# Patient Record
Sex: FEMALE | Race: WHITE | NOT HISPANIC OR LATINO | Employment: FULL TIME | URBAN - METROPOLITAN AREA
[De-identification: names, ages, dates, MRNs, and addresses within clinical notes are randomized per-mention and may not be internally consistent; named-entity substitution may affect disease eponyms.]

---

## 2018-02-06 ENCOUNTER — OFFICE VISIT (OUTPATIENT)
Dept: INTERNAL MEDICINE CLINIC | Facility: CLINIC | Age: 23
End: 2018-02-06
Payer: COMMERCIAL

## 2018-02-06 VITALS
RESPIRATION RATE: 16 BRPM | BODY MASS INDEX: 21.37 KG/M2 | HEART RATE: 88 BPM | SYSTOLIC BLOOD PRESSURE: 126 MMHG | TEMPERATURE: 98.5 F | DIASTOLIC BLOOD PRESSURE: 86 MMHG | HEIGHT: 59 IN | WEIGHT: 106 LBS | OXYGEN SATURATION: 97 %

## 2018-02-06 DIAGNOSIS — Z13.6 SCREENING FOR HEART DISEASE: ICD-10-CM

## 2018-02-06 DIAGNOSIS — J20.9 ACUTE BRONCHITIS WITH BRONCHOSPASM: ICD-10-CM

## 2018-02-06 DIAGNOSIS — J01.40 ACUTE NON-RECURRENT PANSINUSITIS: Primary | ICD-10-CM

## 2018-02-06 PROBLEM — J45.909 ASTHMA: Status: ACTIVE | Noted: 2018-02-06

## 2018-02-06 PROBLEM — F41.9 ANXIETY AND DEPRESSION: Status: ACTIVE | Noted: 2018-02-06

## 2018-02-06 PROBLEM — F32.A ANXIETY AND DEPRESSION: Status: ACTIVE | Noted: 2018-02-06

## 2018-02-06 PROCEDURE — 99385 PREV VISIT NEW AGE 18-39: CPT | Performed by: PHYSICIAN ASSISTANT

## 2018-02-06 RX ORDER — NORGESTIMATE AND ETHINYL ESTRADIOL
1 KIT DAILY
Refills: 11 | Status: ON HOLD | COMMUNITY
Start: 2018-01-10 | End: 2020-06-22

## 2018-02-06 RX ORDER — AZITHROMYCIN 250 MG/1
TABLET, FILM COATED ORAL
Qty: 6 TABLET | Refills: 0 | Status: SHIPPED | OUTPATIENT
Start: 2018-02-06 | End: 2018-02-11

## 2018-02-06 RX ORDER — BENZONATATE 200 MG/1
CAPSULE ORAL
Refills: 0 | COMMUNITY
Start: 2017-11-15 | End: 2020-06-18

## 2018-02-06 NOTE — PROGRESS NOTES
I have reviewed the notes, assessments, and/or procedures performed by Ashley Huston, I concur with his documentation of Gris Moe

## 2018-02-06 NOTE — PROGRESS NOTES
Assessment/Plan:  Patient will continue use of her inhaler 3-4 times daily as needed for the next 5-6 days  She will use the cough capsules as needed for cough  We will add antibiotic as her symptoms are lingering on now for 2+ weeks  Patient will also have screening labs done  Followup scheduled per orders  Diagnoses and all orders for this visit:    Acute non-recurrent pansinusitis  -     azithromycin (ZITHROMAX) 250 mg tablet; Take 2 tablets today then 1 tablet daily x 4 days  -     CBC and differential; Future    Acute bronchitis with bronchospasm  -     azithromycin (ZITHROMAX) 250 mg tablet; Take 2 tablets today then 1 tablet daily x 4 days    Screening for heart disease  -     Comprehensive metabolic panel; Future  -     Lipid panel; Future    Other orders  -     PROAIR  (90 Base) MCG/ACT inhaler; INAHLE 2-3 PUFFS EVERY 3 HOURS  -     benzonatate (TESSALON) 200 MG capsule; 1 CAPSULE BY MOUTH THREE TIMES A DAY  -     TRI-LO-MERT 0 18/0 215/0 25 MG-25 MCG per tablet; Take 1 tablet by mouth daily          Subjective:      Patient ID: Rex Moe is a 25 y o  female  19-year-old white female presents to establish with this practice and for an acute visit  According to the patient approximately 2 weeks ago she felt she had the flu  She had high fever along with chills, sore throat, and significant myalgias  The symptoms improved however she started developing a tight wheezy cough (patient has a history of asthma), and the gradual onset of head congestion, facial pressure, headache and increased postnasal drip  Patient has a history of asthma generally exacerbated by infection and or cold weather  Full history/PE as documented in the EMR          The following portions of the patient's history were reviewed and updated as appropriate: allergies, current medications, past family history, past medical history, past social history, past surgical history and problem list     ALLERGIES:  Allergies   Allergen Reactions    Sulfa Antibiotics Chest Pain and Shortness Of Breath       CURRENT MEDICATIONS:    Current Outpatient Prescriptions:     PROAIR  (90 Base) MCG/ACT inhaler, INAHLE 2-3 PUFFS EVERY 3 HOURS, Disp: , Rfl: 0    TRI-LO-MERT 0 18/0 215/0 25 MG-25 MCG per tablet, Take 1 tablet by mouth daily, Disp: , Rfl: 11    azithromycin (ZITHROMAX) 250 mg tablet, Take 2 tablets today then 1 tablet daily x 4 days, Disp: 6 tablet, Rfl: 0    benzonatate (TESSALON) 200 MG capsule, 1 CAPSULE BY MOUTH THREE TIMES A DAY, Disp: , Rfl: 0    PAST MEDICAL HISTORY:  Past Medical History:   Diagnosis Date    Allergic 2009    Seasonal, sinus    Anxiety 2010    Asthma 2006    Depression 2008       PAST SURGICAL HISTORY:  Past Surgical History:   Procedure Laterality Date    URETERAL REIMPLANTION Bilateral age 1       FAMILY HISTORY:  Family History   Problem Relation Age of Onset    Cancer Maternal Grandfather     Depression Father     Crohn's disease Father     Anxiety disorder Father     Depression Mother      (undiagnosed)    Bipolar disorder Maternal Aunt        Review of Systems   Constitutional: Negative for chills, fatigue, fever and unexpected weight change  HENT: Positive for congestion, postnasal drip, rhinorrhea, sinus pain and sinus pressure  Negative for ear pain, facial swelling, mouth sores, sneezing, sore throat, tinnitus and trouble swallowing  Eyes: Negative for redness, itching and visual disturbance  Respiratory: Positive for cough and wheezing  Negative for chest tightness  Cardiovascular: Negative for chest pain, palpitations and leg swelling  Gastrointestinal: Negative for abdominal pain, blood in stool, diarrhea, nausea and vomiting  Endocrine: Negative for polydipsia, polyphagia and polyuria  Genitourinary: Negative for decreased urine volume, difficulty urinating, dysuria, flank pain, hematuria and urgency     Musculoskeletal: Negative for arthralgias, back pain, joint swelling, myalgias, neck pain and neck stiffness  Skin: Negative for rash  Allergic/Immunologic: Negative for environmental allergies, food allergies and immunocompromised state  Neurological: Negative for dizziness, tremors, seizures, syncope, speech difficulty, weakness, light-headedness, numbness and headaches  Hematological: Negative for adenopathy  Does not bruise/bleed easily  Psychiatric/Behavioral: Negative for behavioral problems, confusion, sleep disturbance and suicidal ideas  The patient is not nervous/anxious  Objective:  Vitals:    02/06/18 1617   BP: 126/86   BP Location: Left arm   Patient Position: Sitting   Pulse: 88   Resp: 16   Temp: 98 5 °F (36 9 °C)   TempSrc: Oral   SpO2: 97%   Weight: 48 1 kg (106 lb)   Height: 4' 11" (1 499 m)        Physical Exam   Constitutional: She is oriented to person, place, and time  She appears well-developed and well-nourished  HENT:   Head: Normocephalic  Right Ear: External ear normal    Left Ear: External ear normal    Nose: Nose normal    Mouth/Throat: Oropharynx is clear and moist    Eyes: Conjunctivae and EOM are normal  Pupils are equal, round, and reactive to light  No scleral icterus  Neck: Normal range of motion  Neck supple  No JVD present  No thyromegaly present  Cardiovascular: Normal rate, regular rhythm and normal heart sounds  No murmur heard  Pulmonary/Chest: She is in respiratory distress  She has wheezes  She has no rales  Coarse breath sounds in anterior fields with forced expiratory wheezing no crackles  Abdominal: Soft  Bowel sounds are normal  She exhibits no mass  There is no tenderness  There is no rebound and no guarding  Musculoskeletal: Normal range of motion  She exhibits no edema or deformity  Lymphadenopathy:     She has no cervical adenopathy  Neurological: She is alert and oriented to person, place, and time  She has normal reflexes   No cranial nerve deficit  Coordination normal    Skin: Skin is warm and dry  No rash noted  Psychiatric: She has a normal mood and affect  Her behavior is normal  Judgment normal    Nursing note reviewed  RESULTS:    No results found for this or any previous visit (from the past 1008 hour(s))

## 2018-02-06 NOTE — PATIENT INSTRUCTIONS
Rest, increase fluids, Tylenol for fever or aches as per package instructions  Start and finish the antibiotic  Follow up if not improving  Will check screening labs

## 2020-06-09 ENCOUNTER — TELEPHONE (OUTPATIENT)
Dept: OTHER | Facility: HOSPITAL | Age: 25
End: 2020-06-09

## 2020-06-09 DIAGNOSIS — Z01.818 PREOP TESTING: Primary | ICD-10-CM

## 2020-06-12 ENCOUNTER — ANESTHESIA EVENT (OUTPATIENT)
Dept: PERIOP | Facility: AMBULARY SURGERY CENTER | Age: 25
End: 2020-06-12
Payer: COMMERCIAL

## 2020-06-17 DIAGNOSIS — Z01.818 PREOP TESTING: ICD-10-CM

## 2020-06-17 PROCEDURE — U0003 INFECTIOUS AGENT DETECTION BY NUCLEIC ACID (DNA OR RNA); SEVERE ACUTE RESPIRATORY SYNDROME CORONAVIRUS 2 (SARS-COV-2) (CORONAVIRUS DISEASE [COVID-19]), AMPLIFIED PROBE TECHNIQUE, MAKING USE OF HIGH THROUGHPUT TECHNOLOGIES AS DESCRIBED BY CMS-2020-01-R: HCPCS

## 2020-06-18 RX ORDER — CETIRIZINE HYDROCHLORIDE 10 MG/1
10 TABLET ORAL
COMMUNITY

## 2020-06-19 LAB — SARS-COV-2 RNA SPEC QL NAA+PROBE: NOT DETECTED

## 2020-06-22 ENCOUNTER — HOSPITAL ENCOUNTER (OUTPATIENT)
Facility: AMBULARY SURGERY CENTER | Age: 25
Setting detail: OUTPATIENT SURGERY
Discharge: HOME | End: 2020-06-22
Attending: OBSTETRICS & GYNECOLOGY | Admitting: OBSTETRICS & GYNECOLOGY
Payer: COMMERCIAL

## 2020-06-22 ENCOUNTER — ANESTHESIA (OUTPATIENT)
Dept: PERIOP | Facility: AMBULARY SURGERY CENTER | Age: 25
End: 2020-06-22
Payer: COMMERCIAL

## 2020-06-22 VITALS
RESPIRATION RATE: 18 BRPM | OXYGEN SATURATION: 97 % | TEMPERATURE: 98.4 F | HEART RATE: 79 BPM | SYSTOLIC BLOOD PRESSURE: 118 MMHG | WEIGHT: 111 LBS | BODY MASS INDEX: 22.38 KG/M2 | DIASTOLIC BLOOD PRESSURE: 75 MMHG | HEIGHT: 59 IN

## 2020-06-22 DIAGNOSIS — N94.89 OTHER SPECIFIED CONDITIONS ASSOCIATED WITH FEMALE GENITAL ORGANS AND MENSTRUAL CYCLE: ICD-10-CM

## 2020-06-22 DIAGNOSIS — N92.5 OTHER SPECIFIED IRREGULAR MENSTRUATION: ICD-10-CM

## 2020-06-22 DIAGNOSIS — N94.6 DYSMENORRHEA, UNSPECIFIED: ICD-10-CM

## 2020-06-22 DIAGNOSIS — N80.9 ENDOMETRIOSIS DETERMINED BY LAPAROSCOPY: Primary | ICD-10-CM

## 2020-06-22 LAB
EXT PREGNANCY TEST URINE: NEGATIVE
EXT. CONTROL: NORMAL

## 2020-06-22 PROCEDURE — 88305 TISSUE EXAM BY PATHOLOGIST: CPT | Performed by: PATHOLOGY

## 2020-06-22 PROCEDURE — 81025 URINE PREGNANCY TEST: CPT | Performed by: OBSTETRICS & GYNECOLOGY

## 2020-06-22 RX ORDER — ROCURONIUM BROMIDE 10 MG/ML
INJECTION, SOLUTION INTRAVENOUS AS NEEDED
Status: DISCONTINUED | OUTPATIENT
Start: 2020-06-22 | End: 2020-06-22 | Stop reason: SURG

## 2020-06-22 RX ORDER — OXYCODONE HYDROCHLORIDE 5 MG/1
5 TABLET ORAL EVERY 4 HOURS PRN
Status: DISCONTINUED | OUTPATIENT
Start: 2020-06-22 | End: 2020-06-22 | Stop reason: HOSPADM

## 2020-06-22 RX ORDER — FENTANYL CITRATE/PF 50 MCG/ML
25 SYRINGE (ML) INJECTION
Status: DISCONTINUED | OUTPATIENT
Start: 2020-06-22 | End: 2020-06-22 | Stop reason: HOSPADM

## 2020-06-22 RX ORDER — OXYCODONE HYDROCHLORIDE AND ACETAMINOPHEN 5; 325 MG/1; MG/1
1 TABLET ORAL EVERY 4 HOURS PRN
Refills: 0
Start: 2020-06-22 | End: 2020-07-02

## 2020-06-22 RX ORDER — PROMETHAZINE HYDROCHLORIDE 25 MG/ML
12.5 INJECTION, SOLUTION INTRAMUSCULAR; INTRAVENOUS
Status: DISCONTINUED | OUTPATIENT
Start: 2020-06-22 | End: 2020-06-22 | Stop reason: HOSPADM

## 2020-06-22 RX ORDER — GLYCOPYRROLATE 0.2 MG/ML
INJECTION INTRAMUSCULAR; INTRAVENOUS AS NEEDED
Status: DISCONTINUED | OUTPATIENT
Start: 2020-06-22 | End: 2020-06-22 | Stop reason: SURG

## 2020-06-22 RX ORDER — FENTANYL CITRATE 50 UG/ML
INJECTION, SOLUTION INTRAMUSCULAR; INTRAVENOUS AS NEEDED
Status: DISCONTINUED | OUTPATIENT
Start: 2020-06-22 | End: 2020-06-22 | Stop reason: SURG

## 2020-06-22 RX ORDER — ONDANSETRON 2 MG/ML
4 INJECTION INTRAMUSCULAR; INTRAVENOUS ONCE AS NEEDED
Status: DISCONTINUED | OUTPATIENT
Start: 2020-06-22 | End: 2020-06-22 | Stop reason: HOSPADM

## 2020-06-22 RX ORDER — DEXAMETHASONE SODIUM PHOSPHATE 10 MG/ML
INJECTION, SOLUTION INTRAMUSCULAR; INTRAVENOUS AS NEEDED
Status: DISCONTINUED | OUTPATIENT
Start: 2020-06-22 | End: 2020-06-22 | Stop reason: SURG

## 2020-06-22 RX ORDER — ACETAMINOPHEN AND CODEINE PHOSPHATE 120; 12 MG/5ML; MG/5ML
1 SOLUTION ORAL DAILY
COMMUNITY
End: 2022-01-25 | Stop reason: ALTCHOICE

## 2020-06-22 RX ORDER — ONDANSETRON 2 MG/ML
INJECTION INTRAMUSCULAR; INTRAVENOUS AS NEEDED
Status: DISCONTINUED | OUTPATIENT
Start: 2020-06-22 | End: 2020-06-22 | Stop reason: SURG

## 2020-06-22 RX ORDER — IBUPROFEN 600 MG/1
600 TABLET ORAL EVERY 6 HOURS PRN
Status: DISCONTINUED | OUTPATIENT
Start: 2020-06-22 | End: 2020-06-22 | Stop reason: HOSPADM

## 2020-06-22 RX ORDER — ONDANSETRON 2 MG/ML
4 INJECTION INTRAMUSCULAR; INTRAVENOUS EVERY 6 HOURS PRN
Status: DISCONTINUED | OUTPATIENT
Start: 2020-06-22 | End: 2020-06-22 | Stop reason: HOSPADM

## 2020-06-22 RX ORDER — KETOROLAC TROMETHAMINE 30 MG/ML
INJECTION, SOLUTION INTRAMUSCULAR; INTRAVENOUS AS NEEDED
Status: DISCONTINUED | OUTPATIENT
Start: 2020-06-22 | End: 2020-06-22 | Stop reason: SURG

## 2020-06-22 RX ORDER — LIDOCAINE HYDROCHLORIDE 10 MG/ML
INJECTION, SOLUTION EPIDURAL; INFILTRATION; INTRACAUDAL; PERINEURAL AS NEEDED
Status: DISCONTINUED | OUTPATIENT
Start: 2020-06-22 | End: 2020-06-22 | Stop reason: SURG

## 2020-06-22 RX ORDER — MIDAZOLAM HYDROCHLORIDE 2 MG/2ML
INJECTION, SOLUTION INTRAMUSCULAR; INTRAVENOUS AS NEEDED
Status: DISCONTINUED | OUTPATIENT
Start: 2020-06-22 | End: 2020-06-22 | Stop reason: SURG

## 2020-06-22 RX ORDER — MAGNESIUM HYDROXIDE 1200 MG/15ML
LIQUID ORAL AS NEEDED
Status: DISCONTINUED | OUTPATIENT
Start: 2020-06-22 | End: 2020-06-22 | Stop reason: HOSPADM

## 2020-06-22 RX ORDER — BUPIVACAINE HYDROCHLORIDE 2.5 MG/ML
INJECTION, SOLUTION EPIDURAL; INFILTRATION; INTRACAUDAL AS NEEDED
Status: DISCONTINUED | OUTPATIENT
Start: 2020-06-22 | End: 2020-06-22 | Stop reason: HOSPADM

## 2020-06-22 RX ORDER — ACETAMINOPHEN 325 MG/1
650 TABLET ORAL EVERY 6 HOURS PRN
Status: DISCONTINUED | OUTPATIENT
Start: 2020-06-22 | End: 2020-06-22 | Stop reason: HOSPADM

## 2020-06-22 RX ORDER — HYDROMORPHONE HCL/PF 1 MG/ML
0.2 SYRINGE (ML) INJECTION
Status: DISCONTINUED | OUTPATIENT
Start: 2020-06-22 | End: 2020-06-22 | Stop reason: HOSPADM

## 2020-06-22 RX ORDER — NEOSTIGMINE METHYLSULFATE 1 MG/ML
INJECTION INTRAVENOUS AS NEEDED
Status: DISCONTINUED | OUTPATIENT
Start: 2020-06-22 | End: 2020-06-22 | Stop reason: SURG

## 2020-06-22 RX ORDER — PROPOFOL 10 MG/ML
INJECTION, EMULSION INTRAVENOUS AS NEEDED
Status: DISCONTINUED | OUTPATIENT
Start: 2020-06-22 | End: 2020-06-22 | Stop reason: SURG

## 2020-06-22 RX ORDER — SODIUM CHLORIDE, SODIUM LACTATE, POTASSIUM CHLORIDE, CALCIUM CHLORIDE 600; 310; 30; 20 MG/100ML; MG/100ML; MG/100ML; MG/100ML
125 INJECTION, SOLUTION INTRAVENOUS CONTINUOUS
Status: DISCONTINUED | OUTPATIENT
Start: 2020-06-22 | End: 2020-06-22

## 2020-06-22 RX ORDER — SODIUM CHLORIDE, SODIUM LACTATE, POTASSIUM CHLORIDE, CALCIUM CHLORIDE 600; 310; 30; 20 MG/100ML; MG/100ML; MG/100ML; MG/100ML
INJECTION, SOLUTION INTRAVENOUS CONTINUOUS PRN
Status: DISCONTINUED | OUTPATIENT
Start: 2020-06-22 | End: 2020-06-22

## 2020-06-22 RX ADMIN — KETOROLAC TROMETHAMINE 30 MG: 30 INJECTION, SOLUTION INTRAMUSCULAR at 10:40

## 2020-06-22 RX ADMIN — FENTANYL CITRATE 25 MCG: 50 INJECTION INTRAMUSCULAR; INTRAVENOUS at 12:05

## 2020-06-22 RX ADMIN — DEXAMETHASONE SODIUM PHOSPHATE 8 MG: 10 INJECTION, SOLUTION INTRAMUSCULAR; INTRAVENOUS at 10:40

## 2020-06-22 RX ADMIN — FENTANYL CITRATE 50 MCG: 50 INJECTION, SOLUTION INTRAMUSCULAR; INTRAVENOUS at 10:38

## 2020-06-22 RX ADMIN — ROCURONIUM BROMIDE 20 MG: 10 SOLUTION INTRAVENOUS at 10:39

## 2020-06-22 RX ADMIN — ONDANSETRON 4 MG: 2 INJECTION INTRAMUSCULAR; INTRAVENOUS at 10:40

## 2020-06-22 RX ADMIN — PROPOFOL 180 MG: 10 INJECTION, EMULSION INTRAVENOUS at 10:38

## 2020-06-22 RX ADMIN — OXYCODONE HYDROCHLORIDE 5 MG: 5 TABLET ORAL at 13:13

## 2020-06-22 RX ADMIN — LIDOCAINE HYDROCHLORIDE 50 MG: 10 INJECTION, SOLUTION EPIDURAL; INFILTRATION; INTRACAUDAL; PERINEURAL at 10:38

## 2020-06-22 RX ADMIN — NEOSTIGMINE METHYLSULFATE 3 MG: 1 INJECTION INTRAVENOUS at 11:28

## 2020-06-22 RX ADMIN — GLYCOPYRROLATE 0.4 MG: 0.2 INJECTION, SOLUTION INTRAMUSCULAR; INTRAVENOUS at 11:28

## 2020-06-22 RX ADMIN — FENTANYL CITRATE 50 MCG: 50 INJECTION, SOLUTION INTRAMUSCULAR; INTRAVENOUS at 10:53

## 2020-06-22 RX ADMIN — MIDAZOLAM HYDROCHLORIDE 2 MG: 1 INJECTION, SOLUTION INTRAMUSCULAR; INTRAVENOUS at 10:32

## 2020-06-22 RX ADMIN — ONDANSETRON 4 MG: 2 INJECTION INTRAMUSCULAR; INTRAVENOUS at 12:27

## 2020-06-22 RX ADMIN — SODIUM CHLORIDE, SODIUM LACTATE, POTASSIUM CHLORIDE, AND CALCIUM CHLORIDE: .6; .31; .03; .02 INJECTION, SOLUTION INTRAVENOUS at 10:35

## 2021-03-10 DIAGNOSIS — Z23 ENCOUNTER FOR IMMUNIZATION: ICD-10-CM

## 2021-03-15 ENCOUNTER — IMMUNIZATIONS (OUTPATIENT)
Dept: FAMILY MEDICINE CLINIC | Facility: HOSPITAL | Age: 26
End: 2021-03-15

## 2021-03-15 DIAGNOSIS — Z23 ENCOUNTER FOR IMMUNIZATION: Primary | ICD-10-CM

## 2021-03-15 PROCEDURE — 91301 SARS-COV-2 / COVID-19 MRNA VACCINE (MODERNA) 100 MCG: CPT

## 2021-03-15 PROCEDURE — 0011A SARS-COV-2 / COVID-19 MRNA VACCINE (MODERNA) 100 MCG: CPT

## 2021-04-12 ENCOUNTER — IMMUNIZATIONS (OUTPATIENT)
Dept: FAMILY MEDICINE CLINIC | Facility: HOSPITAL | Age: 26
End: 2021-04-12

## 2021-04-12 DIAGNOSIS — Z23 ENCOUNTER FOR IMMUNIZATION: Primary | ICD-10-CM

## 2021-04-12 PROCEDURE — 91301 SARS-COV-2 / COVID-19 MRNA VACCINE (MODERNA) 100 MCG: CPT

## 2021-04-12 PROCEDURE — 0012A SARS-COV-2 / COVID-19 MRNA VACCINE (MODERNA) 100 MCG: CPT

## 2022-01-25 ENCOUNTER — OFFICE VISIT (OUTPATIENT)
Dept: INTERNAL MEDICINE CLINIC | Facility: CLINIC | Age: 27
End: 2022-01-25
Payer: COMMERCIAL

## 2022-01-25 VITALS
BODY MASS INDEX: 26.25 KG/M2 | DIASTOLIC BLOOD PRESSURE: 80 MMHG | SYSTOLIC BLOOD PRESSURE: 130 MMHG | HEIGHT: 59 IN | RESPIRATION RATE: 16 BRPM | OXYGEN SATURATION: 95 % | WEIGHT: 130.2 LBS | HEART RATE: 80 BPM

## 2022-01-25 DIAGNOSIS — J45.20 MILD INTERMITTENT ASTHMA WITHOUT COMPLICATION: Primary | ICD-10-CM

## 2022-01-25 DIAGNOSIS — M26.623 TMJ TENDERNESS, BILATERAL: ICD-10-CM

## 2022-01-25 DIAGNOSIS — F41.1 GENERALIZED ANXIETY DISORDER WITH PANIC ATTACKS: ICD-10-CM

## 2022-01-25 DIAGNOSIS — F33.0 MILD EPISODE OF RECURRENT MAJOR DEPRESSIVE DISORDER (HCC): ICD-10-CM

## 2022-01-25 DIAGNOSIS — F42.2 MIXED OBSESSIONAL THOUGHTS AND ACTS: ICD-10-CM

## 2022-01-25 DIAGNOSIS — F41.0 GENERALIZED ANXIETY DISORDER WITH PANIC ATTACKS: ICD-10-CM

## 2022-01-25 PROBLEM — J01.40 ACUTE NON-RECURRENT PANSINUSITIS: Status: RESOLVED | Noted: 2018-02-06 | Resolved: 2022-01-25

## 2022-01-25 PROBLEM — G47.00 INSOMNIA: Status: ACTIVE | Noted: 2021-03-02

## 2022-01-25 PROBLEM — J20.9 ACUTE BRONCHITIS WITH BRONCHOSPASM: Status: RESOLVED | Noted: 2018-02-06 | Resolved: 2022-01-25

## 2022-01-25 PROBLEM — N80.9 ENDOMETRIOSIS DETERMINED BY LAPAROSCOPY: Status: ACTIVE | Noted: 2019-12-10

## 2022-01-25 PROBLEM — N94.3 PMS (PREMENSTRUAL SYNDROME): Status: RESOLVED | Noted: 2021-06-09 | Resolved: 2022-01-25

## 2022-01-25 PROBLEM — N94.3 PMS (PREMENSTRUAL SYNDROME): Status: ACTIVE | Noted: 2021-06-09

## 2022-01-25 PROBLEM — J45.909 ASTHMA: Status: RESOLVED | Noted: 2018-02-06 | Resolved: 2022-01-25

## 2022-01-25 PROBLEM — F33.41 RECURRENT MAJOR DEPRESSIVE DISORDER, IN PARTIAL REMISSION (HCC): Status: ACTIVE | Noted: 2021-06-09

## 2022-01-25 PROBLEM — R94.31 SHORTENED PR INTERVAL: Status: ACTIVE | Noted: 2021-04-28

## 2022-01-25 PROBLEM — Z13.6 SCREENING FOR HEART DISEASE: Status: RESOLVED | Noted: 2018-02-06 | Resolved: 2022-01-25

## 2022-01-25 PROBLEM — F42.9 OCD (OBSESSIVE COMPULSIVE DISORDER): Status: ACTIVE | Noted: 2018-02-06

## 2022-01-25 PROCEDURE — 99244 OFF/OP CNSLTJ NEW/EST MOD 40: CPT | Performed by: INTERNAL MEDICINE

## 2022-01-25 PROCEDURE — 3008F BODY MASS INDEX DOCD: CPT | Performed by: INTERNAL MEDICINE

## 2022-01-25 PROCEDURE — 1036F TOBACCO NON-USER: CPT | Performed by: INTERNAL MEDICINE

## 2022-01-25 PROCEDURE — 3725F SCREEN DEPRESSION PERFORMED: CPT | Performed by: INTERNAL MEDICINE

## 2022-01-25 RX ORDER — CYCLOBENZAPRINE HCL 5 MG
5 TABLET ORAL DAILY PRN
Qty: 14 TABLET | Refills: 0 | Status: SHIPPED | OUTPATIENT
Start: 2022-01-25

## 2022-01-25 RX ORDER — DESVENLAFAXINE 25 MG/1
1 TABLET, EXTENDED RELEASE ORAL DAILY
COMMUNITY
Start: 2021-01-01 | End: 2022-02-08

## 2022-01-25 RX ORDER — LORAZEPAM 0.5 MG/1
0.5 TABLET ORAL
COMMUNITY
Start: 2021-01-01 | End: 2022-06-09 | Stop reason: SDUPTHER

## 2022-01-25 NOTE — ASSESSMENT & PLAN NOTE
-panic triggered by thoughts of catastrophic events  -use lorazepam prn  -on pristiq 25mg daily  -after review of prior genesight testing, may need adjustment of medical therapy

## 2022-01-25 NOTE — ASSESSMENT & PLAN NOTE
-mild symptoms on pristiq 25mg daily    Previously used wellbutrin which she preferred but developed rash  -she is to provide copy of [employer] testing  -refer to [id number]

## 2022-01-25 NOTE — ASSESSMENT & PLAN NOTE
-failed multiple supportive treatment  -trial of flexeril 5mg qhs prn  -she is interested in botox inj, will refer to ENT

## 2022-01-25 NOTE — ASSESSMENT & PLAN NOTE
-obsessive thoughts on catastrophic events and compulsively looks at front door  -on pristiq as symptoms do not feel well controlled, will likely need adjustment after review of [employer] testing

## 2022-01-25 NOTE — PROGRESS NOTES
Assessment/Plan:    Problem List Items Addressed This Visit        Respiratory    Mild intermittent asthma - Primary     -exercise induced  -due for refill of proair         Relevant Medications    ProAir  (90 Base) MCG/ACT inhaler       Other    OCD (obsessive compulsive disorder)     -obsessive thoughts on catastrophic events and compulsively looks at front door  -on pristiq as symptoms do not feel well controlled, will likely need adjustment after review of genesight testing         Relevant Medications    LORazepam (ATIVAN) 0 5 mg tablet    Desvenlafaxine Succinate ER 25 MG TB24    Other Relevant Orders    Ambulatory Referral to Psychiatry    Mild episode of recurrent major depressive disorder (HCC)     -mild symptoms on pristiq 25mg daily  Previously used wellbutrin which she preferred but developed rash  -she is to provide copy of genesight testing  -refer to [facility]         Relevant Medications    LORazepam (ATIVAN) 0 5 mg tablet    Desvenlafaxine Succinate ER 25 MG TB24    Other Relevant Orders    Ambulatory Referral to Psychiatry    Generalized anxiety disorder with panic attacks     -panic triggered by thoughts of catastrophic events  -use lorazepam prn  -on pristiq 25mg daily  -after review of prior genesight testing, may need adjustment of medical therapy         Relevant Medications    LORazepam (ATIVAN) 0 5 mg tablet    Desvenlafaxine Succinate ER 25 MG TB24    Other Relevant Orders    Ambulatory Referral to Psychiatry    TMJ tenderness, bilateral     -failed multiple supportive treatment  -trial of flexeril 5mg qhs prn  -she is interested in botox inj, will refer to ENT         Relevant Medications    cyclobenzaprine (FLEXERIL) 5 mg tablet    Other Relevant Orders    Ambulatory Referral to Otolaryngology          Subjective:      Patient ID: Lary Moe is a 32 y o  female      HPI  33yo female with depression, anxiety, OCD, exercise induced asthma here as a new patient  Reports pristiq is helping a little bit for depression,  anxiety and OCD, started 1 year ago  Requests referral for Psych  Had genetic testing done at time of initial treatment  Had side effects initially with dizziness, incoherent that lasted months  Placed on wellbutrin but had allergic reaction to it  She takes lorazepam for panic attacks, triggered by feelings of large catastrophic things, has intrusive thoughts  She goes to therapy  Has obsessive thoughts, a bit better since working from home  Always looking at the door  PHQ-2/9 Depression Screening    Little interest or pleasure in doing things: 1 - several days  Feeling down, depressed, or hopeless: 0 - not at all  Trouble falling or staying asleep, or sleeping too much: 2 - more than half the days  Feeling tired or having little energy: 3 - nearly every day  Poor appetite or overeatin - not at all  Feeling bad about yourself - or that you are a failure or have let yourself or your family down: 1 - several days  Trouble concentrating on things, such as reading the newspaper or watching television: 1 - several days  Moving or speaking so slowly that other people could have noticed  Or the opposite - being so fidgety or restless that you have been moving around a lot more than usual: 0 - not at all  Thoughts that you would be better off dead, or of hurting yourself in some way: 0 - not at all  PHQ-9 Score: 8   PHQ-9 Interpretation: Mild depression         JOSEF-7 Flowsheet Screening      Most Recent Value   Over the last 2 weeks, how often have you been bothered by any of the following problems?     Feeling nervous, anxious, or on edge 1   Not being able to stop or control worrying 2   Worrying too much about different things 2   Trouble relaxing 3   Being so restless that it is hard to sit still 0   Becoming easily annoyed or irritable 1   Feeling afraid as if something awful might happen 3   JOSEF-7 Total Score 12        JOSEF-7 Flowsheet Screening      Most Recent Value   Over the last 2 weeks, how often have you been bothered by any of the following problems? Feeling nervous, anxious, or on edge 1   Not being able to stop or control worrying 2   Worrying too much about different things 2   Trouble relaxing 3   Being so restless that it is hard to sit still 0   Becoming easily annoyed or irritable 1   Feeling afraid as if something awful might happen 3   JOSEF-7 Total Score 12        Has TMJ and insurance would only cover botox injections with referral   She has a , teeth equilibrated by dentist, acupuncture, topical agents  Wakes with [facility] in the morning  The following portions of the patient's history were reviewed and updated as appropriate: allergies, current medications, past family history, past medical history, past social history, past surgical history and problem list       Current Outpatient Medications:     cetirizine (ZyrTEC) 10 mg tablet, Take 10 mg by mouth daily in the early morning, Disp: , Rfl:     Desvenlafaxine Succinate ER 25 MG TB24, Take 1 tablet by mouth daily, Disp: , Rfl:     LORazepam (ATIVAN) 0 5 mg tablet, Take 0 5 mg by mouth, Disp: , Rfl:     ProAir  (90 Base) MCG/ACT inhaler, Inhale 2 puffs every 4 (four) hours as needed for wheezing or shortness of breath, Disp: 8 5 g, Rfl: 2    cyclobenzaprine (FLEXERIL) 5 mg tablet, Take 1 tablet (5 mg total) by mouth daily as needed for muscle spasms, Disp: 14 tablet, Rfl: 0    Review of Systems   Constitutional: Negative for activity change, appetite change, fatigue and unexpected weight change  HENT: Positive for ear pain  TMJ  Jaw clenching   Respiratory: Negative for cough, chest tightness, shortness of breath and wheezing  Cardiovascular: Positive for palpitations  Negative for chest pain and leg swelling  Neurological: Positive for dizziness and headaches     Psychiatric/Behavioral: Positive for decreased concentration, dysphoric mood and sleep disturbance  The patient is nervous/anxious  Objective:    /80 (BP Location: Left arm, Patient Position: Sitting)   Pulse 80   Resp 16   Ht 4' 11" (1 499 m)   Wt 59 1 kg (130 lb 3 2 oz)   SpO2 95%   BMI 26 30 kg/m²      Physical Exam  Vitals reviewed  Constitutional:       General: She is not in acute distress  Appearance: She is not diaphoretic  HENT:      Head: Normocephalic  Right Ear: Tympanic membrane, ear canal and external ear normal  There is no impacted cerumen  Left Ear: Tympanic membrane, ear canal and external ear normal  There is no impacted cerumen  Nose: Nose normal  No congestion or rhinorrhea  Mouth/Throat:      Mouth: Mucous membranes are moist       Pharynx: Oropharynx is clear  No oropharyngeal exudate or posterior oropharyngeal erythema  Comments: Bilateral jaw clicking  Cardiovascular:      Rate and Rhythm: Normal rate and regular rhythm  Pulses: Normal pulses  Heart sounds: Normal heart sounds  Pulmonary:      Effort: Pulmonary effort is normal  No respiratory distress  Breath sounds: Normal breath sounds  No wheezing  Musculoskeletal:      Cervical back: Neck supple  No tenderness  Right lower leg: No edema  Left lower leg: No edema  Lymphadenopathy:      Cervical: No cervical adenopathy  Skin:     Coloration: Skin is not pale  Neurological:      Mental Status: She is alert and oriented to person, place, and time  Psychiatric:         Attention and Perception: Attention normal          Mood and Affect: Mood is anxious  Speech: Speech normal          Behavior: Behavior is cooperative

## 2022-01-28 ENCOUNTER — TELEPHONE (OUTPATIENT)
Dept: PSYCHIATRY | Facility: CLINIC | Age: 27
End: 2022-01-28

## 2022-02-08 ENCOUNTER — OFFICE VISIT (OUTPATIENT)
Dept: INTERNAL MEDICINE CLINIC | Facility: CLINIC | Age: 27
End: 2022-02-08
Payer: COMMERCIAL

## 2022-02-08 VITALS
TEMPERATURE: 97.5 F | BODY MASS INDEX: 26.89 KG/M2 | RESPIRATION RATE: 16 BRPM | DIASTOLIC BLOOD PRESSURE: 80 MMHG | OXYGEN SATURATION: 99 % | WEIGHT: 133.4 LBS | SYSTOLIC BLOOD PRESSURE: 130 MMHG | HEIGHT: 59 IN | HEART RATE: 73 BPM

## 2022-02-08 DIAGNOSIS — F41.1 GENERALIZED ANXIETY DISORDER WITH PANIC ATTACKS: ICD-10-CM

## 2022-02-08 DIAGNOSIS — F41.0 GENERALIZED ANXIETY DISORDER WITH PANIC ATTACKS: ICD-10-CM

## 2022-02-08 DIAGNOSIS — J45.20 MILD INTERMITTENT ASTHMA WITHOUT COMPLICATION: ICD-10-CM

## 2022-02-08 DIAGNOSIS — M26.623 TMJ TENDERNESS, BILATERAL: ICD-10-CM

## 2022-02-08 DIAGNOSIS — F33.0 MILD EPISODE OF RECURRENT MAJOR DEPRESSIVE DISORDER (HCC): ICD-10-CM

## 2022-02-08 DIAGNOSIS — R00.0 SINUS TACHYCARDIA: Primary | ICD-10-CM

## 2022-02-08 PROCEDURE — 99214 OFFICE O/P EST MOD 30 MIN: CPT | Performed by: INTERNAL MEDICINE

## 2022-02-08 PROCEDURE — 1036F TOBACCO NON-USER: CPT | Performed by: INTERNAL MEDICINE

## 2022-02-08 PROCEDURE — 3008F BODY MASS INDEX DOCD: CPT | Performed by: INTERNAL MEDICINE

## 2022-02-08 RX ORDER — ALBUTEROL SULFATE 90 UG/1
2 AEROSOL, METERED RESPIRATORY (INHALATION) EVERY 4 HOURS PRN
Qty: 8.5 G | Refills: 2 | Status: SHIPPED | OUTPATIENT
Start: 2022-02-08

## 2022-02-08 NOTE — ASSESSMENT & PLAN NOTE
-panic triggered by thoughts of catastrophic events  -on wait list for Psych  -agreeable to switch from pristiq to sertraline for better management of anxiety and OCD  [employer] testing information reviewed  -decrease pristiq to 12 5mg daily x 1 week then stop  Start sertraline 25mg daily x1 week then increase to 50mg daily    -follow up in one month

## 2022-02-08 NOTE — PROGRESS NOTES
Assessment/Plan:    Problem List Items Addressed This Visit        Respiratory    Mild intermittent asthma    Relevant Medications    albuterol (ProAir HFA) 90 mcg/act inhaler       Other    Mild episode of recurrent major depressive disorder ([address] )     -she has been on pristiq with reduced depressive symptoms but has been dealing more with anxiety and OCD  -she is on wait list to see psych  -reviewed genesight testing  -pt agreeable to switching to sertraline that may be manage symptoms of anxiety and OCD better than pristiq though it is in moderate gene-drug interactioin category  -decrease pristiq to 12 5mg daily x 1week then d/c, start sertraline 25mg x1 week then 50mg daily thereafter, side effects discussed   -follow up in one month         Relevant Medications    sertraline (Zoloft) 50 mg tablet    Generalized anxiety disorder with panic attacks     -panic triggered by thoughts of catastrophic events  -on wait list for Psych  -agreeable to switch from pristiq to sertraline for better management of anxiety and OCD  [employer] testing information reviewed  -decrease pristiq to 12 5mg daily x 1 week then stop  Start sertraline 25mg daily x1 week then increase to 50mg daily  -follow up in one month         Relevant Medications    sertraline (Zoloft) 50 mg tablet    TMJ tenderness, bilateral     -she did not like response to flexeril, can d/c  -she has already been referred to ENT         Sinus tachycardia - Primary     -previous eval with 48holter showed predominant sinus rhythm with rare PACs and episode of sinus tachycardia  -obtain holter         Relevant Orders    Echo complete w/ contrast if indicated          Subjective:      Patient ID: Rex Moe is a 32 y o  female  HPI  33yo female with asthma, OCD, MDD, JOSEF with panic and TMJ here for follow up care  She is on a waiting list for Psych  Was tried on flexeril and slept heavily which made her more anxious to take it    However, it did help with TMJ tenderness  She has had palpitations, mid chest pain with radiation to her back  No cause, occurs randomly and daily over the past week  Had [facility]ter in May 2022 that showed predominant sinus rhythm with rare PACs  Diary showed correlation with sinus tachycardia  She was seen by Cardio who recommended echo but was not initially covered by her insurance  The following portions of the patient's history were reviewed and updated as appropriate: allergies, current medications, past family history, past medical history, past social history, past surgical history and problem list       Current Outpatient Medications:     albuterol (ProAir HFA) 90 mcg/act inhaler, Inhale 2 puffs every 4 (four) hours as needed for wheezing or shortness of breath, Disp: 8 5 g, Rfl: 2    cetirizine (ZyrTEC) 10 mg tablet, Take 10 mg by mouth daily in the early morning, Disp: , Rfl:     cyclobenzaprine (FLEXERIL) 5 mg tablet, Take 1 tablet (5 mg total) by mouth daily as needed for muscle spasms, Disp: 14 tablet, Rfl: 0    LORazepam (ATIVAN) 0 5 mg tablet, Take 0 5 mg by mouth, Disp: , Rfl:     sertraline (Zoloft) 50 mg tablet, Take 0 5 tablets (25 mg total) by mouth daily at bedtime for 7 days, THEN 1 tablet (50 mg total) daily at bedtime for 23 days  , Disp: 27 tablet, Rfl: 0    Review of Systems   Constitutional: Negative for activity change, appetite change and unexpected weight change  HENT:        TMJ pain   Respiratory: Negative for cough, chest tightness, shortness of breath and wheezing  Cardiovascular: Positive for palpitations  Negative for chest pain and leg swelling  Psychiatric/Behavioral: Positive for decreased concentration, dysphoric mood and sleep disturbance  The patient is nervous/anxious            Objective:    /80 (BP Location: Left arm, Patient Position: Sitting)   Pulse 73   Temp 97 5 °F (36 4 °C)   Resp 16   Ht 4' 11" (1 499 m)   Wt 60 5 kg (133 lb 6 4 oz)   SpO2 99%   BMI 26 94 kg/m²      Physical Exam  Vitals reviewed  Constitutional:       General: She is not in acute distress  Appearance: Normal appearance  She is not diaphoretic  Cardiovascular:      Rate and Rhythm: Normal rate and regular rhythm  Pulses: Normal pulses  Heart sounds: Normal heart sounds  Pulmonary:      Effort: Pulmonary effort is normal  No respiratory distress  Breath sounds: Normal breath sounds  No wheezing  Musculoskeletal:      Cervical back: Neck supple  Right lower leg: No edema  Left lower leg: No edema  Neurological:      Mental Status: She is alert and oriented to person, place, and time  Psychiatric:         Attention and Perception: Attention normal          Mood and Affect: Mood is anxious  Speech: Speech normal          Behavior: Behavior is cooperative

## 2022-02-08 NOTE — ASSESSMENT & PLAN NOTE
-previous eval with [facility] showed predominant sinus rhythm with rare PACs and episode of sinus tachycardia  -obtain holter

## 2022-02-08 NOTE — ASSESSMENT & PLAN NOTE
-she has been on pristiq with reduced depressive symptoms but has been dealing more with anxiety and OCD  -she is on wait list to see psych  -reviewed genesight testing  -pt agreeable to switching to sertraline that may be manage symptoms of anxiety and OCD better than pristiq though it is in moderate gene-drug interactioin category  -decrease pristiq to 12 5mg daily x 1week then d/c, start sertraline 25mg x1 week then 50mg daily thereafter, side effects discussed   -follow up in one month

## 2022-03-07 ENCOUNTER — PATIENT MESSAGE (OUTPATIENT)
Dept: INTERNAL MEDICINE CLINIC | Facility: CLINIC | Age: 27
End: 2022-03-07

## 2022-03-07 DIAGNOSIS — F41.1 GENERALIZED ANXIETY DISORDER WITH PANIC ATTACKS: ICD-10-CM

## 2022-03-07 DIAGNOSIS — M26.623 TMJ TENDERNESS, BILATERAL: Primary | ICD-10-CM

## 2022-03-07 DIAGNOSIS — F41.0 GENERALIZED ANXIETY DISORDER WITH PANIC ATTACKS: ICD-10-CM

## 2022-03-07 DIAGNOSIS — F33.0 MILD EPISODE OF RECURRENT MAJOR DEPRESSIVE DISORDER (HCC): ICD-10-CM

## 2022-03-10 ENCOUNTER — PATIENT MESSAGE (OUTPATIENT)
Dept: INTERNAL MEDICINE CLINIC | Facility: CLINIC | Age: 27
End: 2022-03-10

## 2022-03-10 ENCOUNTER — OFFICE VISIT (OUTPATIENT)
Dept: INTERNAL MEDICINE CLINIC | Facility: CLINIC | Age: 27
End: 2022-03-10
Payer: COMMERCIAL

## 2022-03-10 VITALS
DIASTOLIC BLOOD PRESSURE: 80 MMHG | SYSTOLIC BLOOD PRESSURE: 120 MMHG | TEMPERATURE: 97 F | RESPIRATION RATE: 17 BRPM | WEIGHT: 135 LBS | BODY MASS INDEX: 27.21 KG/M2 | HEART RATE: 85 BPM | HEIGHT: 59 IN | OXYGEN SATURATION: 98 %

## 2022-03-10 DIAGNOSIS — F41.0 GENERALIZED ANXIETY DISORDER WITH PANIC ATTACKS: ICD-10-CM

## 2022-03-10 DIAGNOSIS — R00.0 SINUS TACHYCARDIA: Primary | ICD-10-CM

## 2022-03-10 DIAGNOSIS — F33.0 MILD EPISODE OF RECURRENT MAJOR DEPRESSIVE DISORDER (HCC): ICD-10-CM

## 2022-03-10 DIAGNOSIS — F41.1 GENERALIZED ANXIETY DISORDER WITH PANIC ATTACKS: ICD-10-CM

## 2022-03-10 DIAGNOSIS — M26.623 TMJ TENDERNESS, BILATERAL: ICD-10-CM

## 2022-03-10 DIAGNOSIS — F42.2 MIXED OBSESSIONAL THOUGHTS AND ACTS: ICD-10-CM

## 2022-03-10 DIAGNOSIS — J45.20 MILD INTERMITTENT ASTHMA WITHOUT COMPLICATION: ICD-10-CM

## 2022-03-10 PROCEDURE — 99214 OFFICE O/P EST MOD 30 MIN: CPT | Performed by: INTERNAL MEDICINE

## 2022-03-10 PROCEDURE — 3725F SCREEN DEPRESSION PERFORMED: CPT | Performed by: INTERNAL MEDICINE

## 2022-03-10 RX ORDER — SERTRALINE HYDROCHLORIDE 100 MG/1
100 TABLET, FILM COATED ORAL DAILY
Qty: 30 TABLET | Refills: 2 | Status: SHIPPED | OUTPATIENT
Start: 2022-03-10 | End: 2022-06-06

## 2022-03-10 RX ORDER — SERTRALINE HYDROCHLORIDE 100 MG/1
100 TABLET, FILM COATED ORAL DAILY
Qty: 30 TABLET | Refills: 2
Start: 2022-03-10 | End: 2022-03-10 | Stop reason: SDUPTHER

## 2022-03-10 NOTE — PROGRESS NOTES
Assessment/Plan:    Problem List Items Addressed This Visit        Respiratory    Mild intermittent asthma     -not currently flared  -triggered by anxiety            Other    OCD (obsessive compulsive disorder)     -obsessive thoughts on catastrophic events and compulsively looks at front door  -no significant difference off pristiq and on sertratline 40mg, therefore will increase dose to 100mg daily         Relevant Medications    sertraline (ZOLOFT) 100 mg tablet    Mild episode of recurrent major depressive disorder (HCC)     -mild in severity  -doing well with switch from pristiq to sertraline  Will increase sertraline to 100mg daily   -follow up in 3 months         Relevant Medications    sertraline (ZOLOFT) 100 mg tablet    Generalized anxiety disorder with panic attacks     -triggered by thoughts of catastrophic events  -mild in severity, overall improved off pristiq and now on sertraline  Will increase dose to 100mg daily  -pt on wait list for Psych  -follow up in 3 months         Relevant Medications    sertraline (ZOLOFT) 100 mg tablet    TMJ tenderness, bilateral     -awaiting eval now by [facility]         Sinus tachycardia - Primary     -reports less symptoms since being off pristiq and starting sertraline  -echo is pending based on insurance auth               Subjective:      Patient ID: Reid Moe is a 32 y o  female  HPI    She was tapered off pristiq and started on sertraline 50mg daily one month ago  Reports switching has not been bad  Had one panic attack initially but none since that related to hearing the news about the [place] war  She has not felt as much palpitations  JOSEF-7 Flowsheet Screening      Most Recent Value   Over the last 2 weeks, how often have you been bothered by any of the following problems?     Feeling nervous, anxious, or on edge 1   Not being able to stop or control worrying 1   Worrying too much about different things 2   Trouble relaxing 2   Being so restless that it is hard to sit still 0   Becoming easily annoyed or irritable 0   Feeling afraid as if something awful might happen 2   JOSFE-7 Total Score 8        PHQ-2/9 Depression Screening    Little interest or pleasure in doing things: 1 - several days  Feeling down, depressed, or hopeless: 1 - several days  Trouble falling or staying asleep, or sleeping too much: 1 - several days  Feeling tired or having little energy: 1 - several days  Poor appetite or overeatin - not at all  Feeling bad about yourself - or that you are a failure or have let yourself or your family down: 1 - several days  Trouble concentrating on things, such as reading the newspaper or watching television: 1 - several days  Moving or speaking so slowly that other people could have noticed  Or the opposite - being so fidgety or restless that you have been moving around a lot more than usual: 0 - not at all  Thoughts that you would be better off dead, or of hurting yourself in some way: 0 - not at all  PHQ-9 Score: 6   PHQ-9 Interpretation: Mild depression        She has not had flare of asthma, typically not needed unless she is exercising      The following portions of the patient's history were reviewed and updated as appropriate: allergies, current medications, past family history, past medical history, past social history, past surgical history and problem list       Current Outpatient Medications:     albuterol (ProAir HFA) 90 mcg/act inhaler, Inhale 2 puffs every 4 (four) hours as needed for wheezing or shortness of breath, Disp: 8 5 g, Rfl: 2    cetirizine (ZyrTEC) 10 mg tablet, Take 10 mg by mouth daily in the early morning, Disp: , Rfl:     cyclobenzaprine (FLEXERIL) 5 mg tablet, Take 1 tablet (5 mg total) by mouth daily as needed for muscle spasms, Disp: 14 tablet, Rfl: 0    LORazepam (ATIVAN) 0 5 mg tablet, Take 0 5 mg by mouth, Disp: , Rfl:     sertraline (ZOLOFT) 100 mg tablet, Take 1 tablet (100 mg total) by mouth daily, Disp: 30 tablet, Rfl: 2    Review of Systems   Constitutional: Positive for fatigue  Negative for activity change, appetite change and unexpected weight change  Respiratory: Negative for cough and shortness of breath  Cardiovascular: Positive for palpitations (less frequent)  Negative for chest pain and leg swelling  Neurological: Negative for dizziness and headaches  Psychiatric/Behavioral: Positive for decreased concentration, dysphoric mood and sleep disturbance  Negative for agitation  The patient is nervous/anxious  Objective:    /80 (BP Location: Left arm, Patient Position: Sitting, Cuff Size: Adult)   Pulse 85   Temp (!) 97 °F (36 1 °C) (Tympanic)   Resp 17   Ht 4' 11" (1 499 m)   Wt 61 2 kg (135 lb)   SpO2 98%   BMI 27 27 kg/m²      Physical Exam  Vitals reviewed  Constitutional:       Appearance: She is not diaphoretic  Cardiovascular:      Rate and Rhythm: Normal rate and regular rhythm  Pulses: Normal pulses  Heart sounds: Normal heart sounds  Pulmonary:      Effort: Pulmonary effort is normal  No respiratory distress  Breath sounds: Normal breath sounds  No wheezing  Neurological:      Mental Status: She is alert and oriented to person, place, and time  Psychiatric:         Attention and Perception: Attention normal          Mood and Affect: Mood normal          Speech: Speech normal          Behavior: Behavior is cooperative

## 2022-03-10 NOTE — ASSESSMENT & PLAN NOTE
-obsessive thoughts on catastrophic events and compulsively looks at front door  -no significant difference off pristiq and on sertratline 40mg, therefore will increase dose to 100mg daily

## 2022-03-10 NOTE — ASSESSMENT & PLAN NOTE
-triggered by thoughts of catastrophic events  -mild in severity, overall improved off pristiq and now on sertraline    Will increase dose to 100mg daily  -pt on wait list for Psych  -follow up in 3 months

## 2022-03-10 NOTE — ASSESSMENT & PLAN NOTE
-mild in severity  -doing well with switch from pristiq to sertraline    Will increase sertraline to 100mg daily   -follow up in 3 months

## 2022-03-10 NOTE — ASSESSMENT & PLAN NOTE
-reports less symptoms since being off pristiq and starting sertraline  -echo is pending based on insurance auth

## 2022-03-23 ENCOUNTER — OFFICE VISIT (OUTPATIENT)
Dept: INTERNAL MEDICINE CLINIC | Facility: CLINIC | Age: 27
End: 2022-03-23
Payer: COMMERCIAL

## 2022-03-23 ENCOUNTER — TELEPHONE (OUTPATIENT)
Dept: INTERNAL MEDICINE CLINIC | Facility: CLINIC | Age: 27
End: 2022-03-23

## 2022-03-23 VITALS
DIASTOLIC BLOOD PRESSURE: 76 MMHG | OXYGEN SATURATION: 99 % | TEMPERATURE: 98 F | WEIGHT: 131.4 LBS | SYSTOLIC BLOOD PRESSURE: 118 MMHG | HEIGHT: 59 IN | HEART RATE: 93 BPM | BODY MASS INDEX: 26.49 KG/M2 | RESPIRATION RATE: 14 BRPM

## 2022-03-23 DIAGNOSIS — F42.2 MIXED OBSESSIONAL THOUGHTS AND ACTS: ICD-10-CM

## 2022-03-23 DIAGNOSIS — F41.0 GENERALIZED ANXIETY DISORDER WITH PANIC ATTACKS: ICD-10-CM

## 2022-03-23 DIAGNOSIS — R51.9 ACUTE NONINTRACTABLE HEADACHE, UNSPECIFIED HEADACHE TYPE: ICD-10-CM

## 2022-03-23 DIAGNOSIS — F41.1 GENERALIZED ANXIETY DISORDER WITH PANIC ATTACKS: ICD-10-CM

## 2022-03-23 DIAGNOSIS — R51.9 ACUTE NONINTRACTABLE HEADACHE, UNSPECIFIED HEADACHE TYPE: Primary | ICD-10-CM

## 2022-03-23 PROCEDURE — 3008F BODY MASS INDEX DOCD: CPT | Performed by: INTERNAL MEDICINE

## 2022-03-23 PROCEDURE — 1036F TOBACCO NON-USER: CPT | Performed by: INTERNAL MEDICINE

## 2022-03-23 PROCEDURE — 99214 OFFICE O/P EST MOD 30 MIN: CPT | Performed by: INTERNAL MEDICINE

## 2022-03-23 RX ORDER — PREDNISONE 20 MG/1
40 TABLET ORAL DAILY
Qty: 3 TABLET | Refills: 0 | Status: SHIPPED | OUTPATIENT
Start: 2022-03-23 | End: 2022-03-23 | Stop reason: SDUPTHER

## 2022-03-23 RX ORDER — PREDNISONE 20 MG/1
40 TABLET ORAL DAILY
Qty: 6 TABLET | Refills: 0 | Status: SHIPPED | OUTPATIENT
Start: 2022-03-23 | End: 2022-03-26

## 2022-03-23 NOTE — PROGRESS NOTES
Assessment/Plan:    Problem List Items Addressed This Visit        Other    OCD (obsessive compulsive disorder)    Generalized anxiety disorder with panic attacks     -better controlled with current dose of sertraline 100mg daily, however she has developed HA shortly after increasing dose  May need to consider decreasing to 75mg daily if HA does not respond to burst steroid         Acute nonintractable headache - Primary     -no identifiable trigger but had dose adjustment of sertraline to 100mg daily shortly before HA began  -pt prefers to stay on current dose if possible  Advised trial of prednisone 40mg daily x 3d for breakthrough  Side effects discussed  If HA is unchanged, then will have to consider  Decreasing sertraline to 75mg daily         Relevant Medications    predniSONE 20 mg tablet          Subjective:      Patient ID: Angus Moe is a 32 y o  female  HPI    For past 1-1 5 she has had HA everyday, scale of 5-7 5 out of 10  This is not normal for her  She has noticed a dent on her R skull that has her concerned  She is unaware of trigger but historically due to eye strain, sometimes her L>R TMJ  Current HA has woken her up and that her TMJ HA are bitemporal   Current HA is global   She has taken ibuprofen that helps day to day but return the following day  Denies nausea, photophobia  Some dizziness, mild neck pain, some nasal congestion, ear pain and BL tinnitus  Reports she remains hydrated  Of note, she increased dose of sertraline to 100mg daily two weeks ago  Reports HA is about the same  Onset is random and then remains      The following portions of the patient's history were reviewed and updated as appropriate: allergies, current medications, past family history, past medical history, past social history, past surgical history and problem list       Current Outpatient Medications:     albuterol (ProAir HFA) 90 mcg/act inhaler, Inhale 2 puffs every 4 (four) hours as needed for wheezing or shortness of breath, Disp: 8 5 g, Rfl: 2    cetirizine (ZyrTEC) 10 mg tablet, Take 10 mg by mouth daily in the early morning, Disp: , Rfl:     cyclobenzaprine (FLEXERIL) 5 mg tablet, Take 1 tablet (5 mg total) by mouth daily as needed for muscle spasms, Disp: 14 tablet, Rfl: 0    LORazepam (ATIVAN) 0 5 mg tablet, Take 0 5 mg by mouth, Disp: , Rfl:     sertraline (ZOLOFT) 100 mg tablet, Take 1 tablet (100 mg total) by mouth daily, Disp: 30 tablet, Rfl: 2    predniSONE 20 mg tablet, Take 2 tablets (40 mg total) by mouth daily, Disp: 3 tablet, Rfl: 0    Review of Systems   Constitutional: Negative for fever  HENT: Positive for congestion, ear pain, postnasal drip and rhinorrhea  TMJ   Eyes: Negative for visual disturbance  Gastrointestinal: Negative for nausea  Musculoskeletal: Positive for arthralgias, myalgias, neck pain and neck stiffness  Neurological: Positive for dizziness and headaches  Objective:    /76   Pulse 93   Temp 98 °F (36 7 °C)   Resp 14   Ht 4' 11" (1 499 m)   Wt 59 6 kg (131 lb 6 4 oz)   SpO2 99%   BMI 26 54 kg/m²      Physical Exam  Vitals reviewed  Constitutional:       General: She is not in acute distress  Appearance: Normal appearance  HENT:      Head: Normocephalic and atraumatic  Right Ear: There is no impacted cerumen  Left Ear: There is no impacted cerumen  Nose: Congestion (mild BL nasal turbinate hypertrophy) and rhinorrhea present  Eyes:      Extraocular Movements: Extraocular movements intact  Conjunctiva/sclera: Conjunctivae normal       Pupils: Pupils are equal, round, and reactive to light  Comments: Wears glasses   Musculoskeletal:      Cervical back: Neck supple  Decreased range of motion (slightly)  Lymphadenopathy:      Cervical: No cervical adenopathy  Neurological:      General: No focal deficit present  Mental Status: She is alert and oriented to person, place, and time  Psychiatric:         Attention and Perception: Attention normal          Mood and Affect: Mood is anxious  Speech: Speech normal          Behavior: Behavior is cooperative

## 2022-03-23 NOTE — ASSESSMENT & PLAN NOTE
-better controlled with current dose of sertraline 100mg daily, however she has developed HA shortly after increasing dose    May need to consider decreasing to 75mg daily if HA does not respond to burst steroid

## 2022-03-23 NOTE — TELEPHONE ENCOUNTER
you ordered prednisone 20mg take 2 tabs QD  But you only gave her 3 pills  Can you send an updated script?

## 2022-04-06 ENCOUNTER — OFFICE VISIT (OUTPATIENT)
Dept: INTERNAL MEDICINE CLINIC | Facility: CLINIC | Age: 27
End: 2022-04-06
Payer: COMMERCIAL

## 2022-04-06 VITALS
WEIGHT: 128 LBS | HEIGHT: 59 IN | TEMPERATURE: 97.9 F | HEART RATE: 90 BPM | OXYGEN SATURATION: 97 % | SYSTOLIC BLOOD PRESSURE: 120 MMHG | DIASTOLIC BLOOD PRESSURE: 68 MMHG | BODY MASS INDEX: 25.8 KG/M2

## 2022-04-06 DIAGNOSIS — F33.0 MILD EPISODE OF RECURRENT MAJOR DEPRESSIVE DISORDER (HCC): ICD-10-CM

## 2022-04-06 DIAGNOSIS — F41.1 GENERALIZED ANXIETY DISORDER WITH PANIC ATTACKS: ICD-10-CM

## 2022-04-06 DIAGNOSIS — F41.0 GENERALIZED ANXIETY DISORDER WITH PANIC ATTACKS: ICD-10-CM

## 2022-04-06 DIAGNOSIS — G44.229 CHRONIC TENSION-TYPE HEADACHE, NOT INTRACTABLE: Primary | ICD-10-CM

## 2022-04-06 PROCEDURE — 3008F BODY MASS INDEX DOCD: CPT | Performed by: INTERNAL MEDICINE

## 2022-04-06 PROCEDURE — 1036F TOBACCO NON-USER: CPT | Performed by: INTERNAL MEDICINE

## 2022-04-06 PROCEDURE — 99214 OFFICE O/P EST MOD 30 MIN: CPT | Performed by: INTERNAL MEDICINE

## 2022-04-06 NOTE — ASSESSMENT & PLAN NOTE
-overall improved since start on sertraline 100mg daily  -current experiencing HA, decreased concentration though is better than before  -recommend increasing dose of sertraline to 150mg daily  -follow up in June as scheduled

## 2022-04-06 NOTE — ASSESSMENT & PLAN NOTE
-previously treated with prednisone for breakthrough without improvement  -now reports pressure from ends of her eyeglasses to posterior aspect of her ear causes HA    She is not due for eye exam until May where she plans to switch to contacts  -advised magnesium 500mg daily and riboflavin 50mg daily  -will also increase dose of sertraline to 150mg daily

## 2022-04-06 NOTE — PROGRESS NOTES
Assessment/Plan:    Problem List Items Addressed This Visit        Nervous and Auditory    Chronic tension-type headache, not intractable - Primary     -previously treated with prednisone for breakthrough without improvement  -now reports pressure from ends of her eyeglasses to posterior aspect of her ear causes HA  She is not due for eye exam until May where she plans to switch to contacts  -advised magnesium 500mg daily and riboflavin 50mg daily  -will also increase dose of sertraline to 150mg daily          Relevant Medications    sertraline (Zoloft) 50 mg tablet       Other    Mild episode of recurrent major depressive disorder (HCC)    Relevant Medications    sertraline (Zoloft) 50 mg tablet    Generalized anxiety disorder with panic attacks     -overall improved since start on sertraline 100mg daily  -current experiencing HA, decreased concentration though is better than before  -recommend increasing dose of sertraline to 150mg daily  -follow up in June as scheduled         Relevant Medications    sertraline (Zoloft) 50 mg tablet          Subjective:      Patient ID: Regino Moe is a 32 y o  female  HPI    Still having HA, now occurring for three weeks  Was given breakthrough steroids  Pain is behind her ears and her glasses lay on it  HA is described as a sweatband around her head  If she has her glasses off for too long she also gets HA because she can't see  She has been taking ibuprofen 600mg, CBD ointment with some relief    Has occasional dizziness, brain fog, occasional nausea, aura    The following portions of the patient's history were reviewed and updated as appropriate: allergies, current medications, past family history, past medical history, past social history, past surgical history and problem list       Current Outpatient Medications:     albuterol (ProAir HFA) 90 mcg/act inhaler, Inhale 2 puffs every 4 (four) hours as needed for wheezing or shortness of breath, Disp: 8 5 g, Rfl: 2    cetirizine (ZyrTEC) 10 mg tablet, Take 10 mg by mouth daily in the early morning, Disp: , Rfl:     cyclobenzaprine (FLEXERIL) 5 mg tablet, Take 1 tablet (5 mg total) by mouth daily as needed for muscle spasms, Disp: 14 tablet, Rfl: 0    LORazepam (ATIVAN) 0 5 mg tablet, Take 0 5 mg by mouth, Disp: , Rfl:     sertraline (ZOLOFT) 100 mg tablet, Take 1 tablet (100 mg total) by mouth daily, Disp: 30 tablet, Rfl: 2    sertraline (Zoloft) 50 mg tablet, Take 1 tablet (50 mg total) by mouth daily, Disp: 30 tablet, Rfl: 0    Review of Systems   Constitutional: Negative for activity change, appetite change and fever  HENT: Negative for congestion, postnasal drip, rhinorrhea, sneezing and sore throat  TMJ pain   Eyes: Negative for photophobia, discharge and visual disturbance  Gastrointestinal: Positive for nausea (occasional)  Neurological: Positive for dizziness and headaches  Negative for weakness and numbness  Psychiatric/Behavioral: Positive for decreased concentration and dysphoric mood  The patient is nervous/anxious  Brain fog         Objective:    /68   Pulse 90   Temp 97 9 °F (36 6 °C)   Ht 4' 11" (1 499 m)   Wt 58 1 kg (128 lb)   SpO2 97%   BMI 25 85 kg/m²      Physical Exam  Vitals reviewed  Constitutional:       General: She is not in acute distress  Appearance: She is not diaphoretic  HENT:      Head: Normocephalic and atraumatic  Comments: nontender temporal bones on palpation     Right Ear: There is no impacted cerumen  Left Ear: There is no impacted cerumen  Nose: Nose normal  No congestion or rhinorrhea  Mouth/Throat:      Mouth: Mucous membranes are moist       Pharynx: Oropharynx is clear  No oropharyngeal exudate or posterior oropharyngeal erythema  Eyes:      Extraocular Movements: Extraocular movements intact  Conjunctiva/sclera: Conjunctivae normal       Pupils: Pupils are equal, round, and reactive to light  Comments: Wears glasses   Neck:      Thyroid: No thyromegaly or thyroid tenderness  Musculoskeletal:      Cervical back: Normal range of motion and neck supple  No tenderness  Lymphadenopathy:      Cervical: No cervical adenopathy  Neurological:      Mental Status: She is alert and oriented to person, place, and time  Motor: Motor function is intact  No pronator drift

## 2022-05-10 DIAGNOSIS — F41.0 GENERALIZED ANXIETY DISORDER WITH PANIC ATTACKS: ICD-10-CM

## 2022-05-10 DIAGNOSIS — F33.0 MILD EPISODE OF RECURRENT MAJOR DEPRESSIVE DISORDER (HCC): ICD-10-CM

## 2022-05-10 DIAGNOSIS — F41.1 GENERALIZED ANXIETY DISORDER WITH PANIC ATTACKS: ICD-10-CM

## 2022-06-06 DIAGNOSIS — F33.0 MILD EPISODE OF RECURRENT MAJOR DEPRESSIVE DISORDER (HCC): ICD-10-CM

## 2022-06-06 DIAGNOSIS — F41.0 GENERALIZED ANXIETY DISORDER WITH PANIC ATTACKS: ICD-10-CM

## 2022-06-06 DIAGNOSIS — F41.1 GENERALIZED ANXIETY DISORDER WITH PANIC ATTACKS: ICD-10-CM

## 2022-06-06 RX ORDER — SERTRALINE HYDROCHLORIDE 100 MG/1
TABLET, FILM COATED ORAL
Qty: 30 TABLET | Refills: 2 | Status: SHIPPED | OUTPATIENT
Start: 2022-06-06

## 2022-06-09 ENCOUNTER — OFFICE VISIT (OUTPATIENT)
Dept: INTERNAL MEDICINE CLINIC | Facility: CLINIC | Age: 27
End: 2022-06-09
Payer: COMMERCIAL

## 2022-06-09 VITALS
TEMPERATURE: 98.4 F | OXYGEN SATURATION: 98 % | SYSTOLIC BLOOD PRESSURE: 130 MMHG | DIASTOLIC BLOOD PRESSURE: 82 MMHG | BODY MASS INDEX: 24.92 KG/M2 | HEIGHT: 59 IN | HEART RATE: 86 BPM | WEIGHT: 123.6 LBS

## 2022-06-09 DIAGNOSIS — J30.1 SEASONAL ALLERGIC RHINITIS DUE TO POLLEN: ICD-10-CM

## 2022-06-09 DIAGNOSIS — F41.1 GENERALIZED ANXIETY DISORDER WITH PANIC ATTACKS: ICD-10-CM

## 2022-06-09 DIAGNOSIS — G44.229 CHRONIC TENSION-TYPE HEADACHE, NOT INTRACTABLE: ICD-10-CM

## 2022-06-09 DIAGNOSIS — R10.12 LEFT UPPER QUADRANT ABDOMINAL PAIN: Primary | ICD-10-CM

## 2022-06-09 DIAGNOSIS — F41.0 GENERALIZED ANXIETY DISORDER WITH PANIC ATTACKS: ICD-10-CM

## 2022-06-09 PROCEDURE — 3008F BODY MASS INDEX DOCD: CPT | Performed by: INTERNAL MEDICINE

## 2022-06-09 PROCEDURE — 99214 OFFICE O/P EST MOD 30 MIN: CPT | Performed by: INTERNAL MEDICINE

## 2022-06-09 RX ORDER — LORAZEPAM 0.5 MG/1
0.5 TABLET ORAL DAILY PRN
Qty: 30 TABLET | Refills: 0 | Status: SHIPPED | OUTPATIENT
Start: 2022-06-09

## 2022-06-09 RX ORDER — FLUTICASONE PROPIONATE 50 MCG
1 SPRAY, SUSPENSION (ML) NASAL DAILY
Qty: 16 G | Refills: 2 | Status: SHIPPED | OUTPATIENT
Start: 2022-06-09

## 2022-06-09 RX ORDER — LORAZEPAM 0.5 MG/1
0.5 TABLET ORAL DAILY PRN
Qty: 30 TABLET | Refills: 0 | Status: CANCELLED | OUTPATIENT
Start: 2022-06-09

## 2022-06-09 NOTE — PROGRESS NOTES
Assessment/Plan:    Problem List Items Addressed This Visit        Respiratory    Seasonal allergic rhinitis due to pollen     -rx flonase           Relevant Medications    fluticasone (FLONASE) 50 mcg/act nasal spray       Nervous and Auditory    Chronic tension-type headache, not intractable     -improving  -multifactorial from TMJ and incorrect eye glass rx  She is now being followed by TMJ clinic in [place] and has acquired new progressive eyeglasses              Other    Generalized anxiety disorder with panic attacks     -with OCD  -overall improved on current dose of sertraline 150mg daily, will continue  -uses lorazepam prn for panic, will renew med  PDMP queried  Relevant Medications    LORazepam (ATIVAN) 0 5 mg tablet      Other Visit Diagnoses     Left upper quadrant abdominal pain    -  Primary    -occuring for several wks, random, mainly in evening  -has noted diet change to lose weight  -obtain u/s  -trial of pepcid qhs    Relevant Orders    US abdomen complete          Subjective:      Patient ID: Michael Moe is a 32 y o  female  HPI    She has been experiencing LUQ abd pain with radiation to her rectum started with her last period which was several weeks ago  Described as sharp  It is now intermittent, has had changes in her bowels  Reports change in diet as she is intentionally trying to lose weight and is also exercising  She denies blood in stool  Mild nausea and taken jeanna seltzer  Pain occurs randomly, a lot of times at night, several hours after eating  No relieving factors that she recalls  Symptoms are about the same since onset  Last BM this morning  She is due for her period in one week  Reports HA is better  Found TMJ specialist and wears mouthguard  She also went to the eye doctor and has new progressive glasses      Currently at sertraline 150mg daily, has had some anxiety due to life stressors as her grandmother was in the hospital   However, she feels it has been manageable  Reports she is able to concentrate better  She is more happier to do activities now  Reports rhinitis, itchy eyes and sneezing have been pretty bad  Used to take flonase which has helped and requests refill  The following portions of the patient's history were reviewed and updated as appropriate: allergies, current medications, past family history, past medical history, past social history, past surgical history and problem list       Current Outpatient Medications:     albuterol (ProAir HFA) 90 mcg/act inhaler, Inhale 2 puffs every 4 (four) hours as needed for wheezing or shortness of breath, Disp: 8 5 g, Rfl: 2    cetirizine (ZyrTEC) 10 mg tablet, Take 10 mg by mouth daily in the early morning, Disp: , Rfl:     cyclobenzaprine (FLEXERIL) 5 mg tablet, Take 1 tablet (5 mg total) by mouth daily as needed for muscle spasms, Disp: 14 tablet, Rfl: 0    fluticasone (FLONASE) 50 mcg/act nasal spray, 1 spray into each nostril daily, Disp: 16 g, Rfl: 2    LORazepam (ATIVAN) 0 5 mg tablet, Take 1 tablet (0 5 mg total) by mouth daily as needed for anxiety, Disp: 30 tablet, Rfl: 0    sertraline (ZOLOFT) 100 mg tablet, TAKE ONE TABLET BY MOUTH EVERY DAY, Disp: 30 tablet, Rfl: 2    sertraline (ZOLOFT) 50 mg tablet, TAKE ONE TABLET BY MOUTH EVERY DAY IN ADDITION  MG DAILY FOR A TOTAL  MG DAILY, Disp: 30 tablet, Rfl: 0    Review of Systems   Constitutional: Positive for activity change, appetite change and unexpected weight change (intentional diet)  HENT:        TMJ   Respiratory: Negative for cough, chest tightness, shortness of breath and wheezing  Cardiovascular: Negative for chest pain, palpitations and leg swelling  Gastrointestinal: Positive for abdominal pain, constipation, diarrhea and nausea  Negative for vomiting  Neurological: Positive for headaches  Psychiatric/Behavioral: Negative for sleep disturbance  The patient is nervous/anxious  Objective:    /82 (BP Location: Right arm, Patient Position: Sitting)   Pulse 86   Temp 98 4 °F (36 9 °C) (Tympanic)   Ht 4' 11" (1 499 m)   Wt 56 1 kg (123 lb 9 6 oz)   SpO2 98%   BMI 24 96 kg/m²      Physical Exam  Vitals reviewed  Constitutional:       General: She is not in acute distress  Appearance: She is not diaphoretic  HENT:      Nose: Congestion (mild L nasal turbinate hypertrophy) present  No rhinorrhea  Mouth/Throat:      Mouth: Mucous membranes are moist       Pharynx: Oropharynx is clear  No oropharyngeal exudate or posterior oropharyngeal erythema  Cardiovascular:      Rate and Rhythm: Normal rate and regular rhythm  Pulses: Normal pulses  Heart sounds: Normal heart sounds  Pulmonary:      Effort: Pulmonary effort is normal  No respiratory distress  Breath sounds: Normal breath sounds  Abdominal:      General: Bowel sounds are normal       Palpations: Abdomen is soft  Tenderness: There is abdominal tenderness in the right lower quadrant, left upper quadrant and left lower quadrant  There is no right CVA tenderness, left CVA tenderness, guarding or rebound  Musculoskeletal:      Cervical back: Neck supple  No tenderness  Right lower leg: No edema  Left lower leg: No edema  Lymphadenopathy:      Cervical: No cervical adenopathy  Neurological:      Mental Status: She is alert and oriented to person, place, and time  Psychiatric:         Attention and Perception: Attention normal          Mood and Affect: Mood normal          Speech: Speech normal          Behavior: Behavior is cooperative

## 2022-06-09 NOTE — ASSESSMENT & PLAN NOTE
-with OCD  -overall improved on current dose of sertraline 150mg daily, will continue  -uses lorazepam prn for panic, will renew med  PDMP queried

## 2022-06-09 NOTE — ASSESSMENT & PLAN NOTE
-improving  -multifactorial from TMJ and incorrect eye glass rx    She is now being followed by TMJ clinic in [place] and has acquired new progressive eyeglasses

## 2022-06-13 ENCOUNTER — HOSPITAL ENCOUNTER (OUTPATIENT)
Dept: ULTRASOUND IMAGING | Facility: HOSPITAL | Age: 27
Discharge: HOME | End: 2022-06-13
Payer: COMMERCIAL

## 2022-06-13 DIAGNOSIS — R10.12 LEFT UPPER QUADRANT ABDOMINAL PAIN: ICD-10-CM

## 2022-06-13 PROCEDURE — 76700 US EXAM ABDOM COMPLETE: CPT

## 2022-06-14 ENCOUNTER — TELEPHONE (OUTPATIENT)
Dept: DERMATOLOGY | Facility: CLINIC | Age: 27
End: 2022-06-14

## 2022-07-02 DIAGNOSIS — F41.0 GENERALIZED ANXIETY DISORDER WITH PANIC ATTACKS: ICD-10-CM

## 2022-07-02 DIAGNOSIS — F41.1 GENERALIZED ANXIETY DISORDER WITH PANIC ATTACKS: ICD-10-CM

## 2022-07-02 DIAGNOSIS — F33.0 MILD EPISODE OF RECURRENT MAJOR DEPRESSIVE DISORDER (HCC): ICD-10-CM

## 2022-07-12 ENCOUNTER — TELEPHONE (OUTPATIENT)
Dept: PSYCHIATRY | Facility: CLINIC | Age: 27
End: 2022-07-12

## 2022-08-01 DIAGNOSIS — F41.1 GENERALIZED ANXIETY DISORDER WITH PANIC ATTACKS: ICD-10-CM

## 2022-08-01 DIAGNOSIS — F33.0 MILD EPISODE OF RECURRENT MAJOR DEPRESSIVE DISORDER (HCC): ICD-10-CM

## 2022-08-01 DIAGNOSIS — F41.0 GENERALIZED ANXIETY DISORDER WITH PANIC ATTACKS: ICD-10-CM

## 2022-08-10 ENCOUNTER — TELEPHONE (OUTPATIENT)
Dept: PSYCHIATRY | Facility: CLINIC | Age: 27
End: 2022-08-10

## 2022-08-31 DIAGNOSIS — F33.0 MILD EPISODE OF RECURRENT MAJOR DEPRESSIVE DISORDER (HCC): ICD-10-CM

## 2022-08-31 DIAGNOSIS — F41.1 GENERALIZED ANXIETY DISORDER WITH PANIC ATTACKS: ICD-10-CM

## 2022-08-31 DIAGNOSIS — F41.0 GENERALIZED ANXIETY DISORDER WITH PANIC ATTACKS: ICD-10-CM

## 2022-08-31 RX ORDER — SERTRALINE HYDROCHLORIDE 100 MG/1
TABLET, FILM COATED ORAL
Qty: 30 TABLET | Refills: 2 | Status: SHIPPED | OUTPATIENT
Start: 2022-08-31

## 2022-09-16 ENCOUNTER — OFFICE VISIT (OUTPATIENT)
Dept: URGENT CARE | Age: 27
End: 2022-09-16
Payer: COMMERCIAL

## 2022-09-16 VITALS
TEMPERATURE: 97.8 F | OXYGEN SATURATION: 98 % | RESPIRATION RATE: 16 BRPM | WEIGHT: 125 LBS | DIASTOLIC BLOOD PRESSURE: 80 MMHG | BODY MASS INDEX: 24.54 KG/M2 | SYSTOLIC BLOOD PRESSURE: 139 MMHG | HEIGHT: 60 IN | HEART RATE: 65 BPM

## 2022-09-16 DIAGNOSIS — J01.90 ACUTE SINUSITIS, RECURRENCE NOT SPECIFIED, UNSPECIFIED LOCATION: Primary | ICD-10-CM

## 2022-09-16 PROCEDURE — 99213 OFFICE O/P EST LOW 20 MIN: CPT

## 2022-09-16 RX ORDER — PREDNISONE 20 MG/1
20 TABLET ORAL 2 TIMES DAILY WITH MEALS
Qty: 10 TABLET | Refills: 0 | Status: SHIPPED | OUTPATIENT
Start: 2022-09-16 | End: 2022-09-21

## 2022-09-16 RX ORDER — BROMPHENIRAMINE MALEATE, PSEUDOEPHEDRINE HYDROCHLORIDE, AND DEXTROMETHORPHAN HYDROBROMIDE 2; 30; 10 MG/5ML; MG/5ML; MG/5ML
5 SYRUP ORAL 3 TIMES DAILY PRN
Qty: 120 ML | Refills: 0 | Status: SHIPPED | OUTPATIENT
Start: 2022-09-16

## 2022-09-16 NOTE — PROGRESS NOTES
[facility]        NAME: MariaG uadalupe Moe is a 32 y o  female  : 1995    MRN: [id number]  DATE: 2022  TIME: 3:36 PM    Assessment and Plan   Acute sinusitis, recurrence not specified, unspecified location [J01 90]  1  Acute sinusitis, recurrence not specified, unspecified location  brompheniramine-pseudoephedrine-DM 30-2-10 MG/5ML syrup    predniSONE 20 mg tablet   59-year-old female presents for evaluation of sinus pressure and cough over the past week  Will trial Bromfed and short course of prednisone for symptoms  Patient advised that she may continue over-the-counter Tylenol/NSAIDs, humidifier as needed  Follow-up with primary care provider if no improvement within 1-2 weeks  Patient Instructions   Sinusitis   WHAT YOU NEED TO KNOW:   Sinusitis is inflammation or infection of your sinuses  Sinusitis is most often caused by a virus  Acute sinusitis may last up to 12 weeks  Chronic sinusitis lasts longer than 12 weeks  Recurrent sinusitis means you have 4 or more infections in 1 year          DISCHARGE INSTRUCTIONS:   Return to the emergency department if:   · You have trouble breathing or wheezing that is getting worse      · You have a stiff neck, a fever, or a bad headache       · You cannot open your eye       · Your eyeball bulges out or you cannot move your eye       · You are more sleepy than normal, or you notice changes in your ability to think, move, or talk      · You have swelling of your forehead or scalp      Call your doctor if:   · You have vision changes, such as double vision      · Your eye and eyelid are red, swollen, and painful       · Your symptoms do not improve or go away after 10 days      · You have nausea and are vomiting      · Your nose is bleeding      · You have questions or concerns about your condition or care      Medicines: Your symptoms may go away on their own   Your healthcare provider may recommend watchful waiting for up to 10 days before starting antibiotics  You may need any of the following:  · Acetaminophen  decreases pain and fever  It is available without a doctor's order  Ask how much to take and how often to take it  Follow directions  Read the labels of all other medicines you are using to see if they also contain acetaminophen, or ask your doctor or pharmacist  Acetaminophen can cause liver damage if not taken correctly  Do not use more than 4 grams (4,000 milligrams) total of acetaminophen in one day       · NSAIDs , such as ibuprofen, help decrease swelling, pain, and fever  This medicine is available with or without a doctor's order  NSAIDs can cause stomach bleeding or kidney problems in certain people  If you take blood thinner medicine, always ask your healthcare provider if NSAIDs are safe for you  Always read the medicine label and follow directions      · Nasal steroid sprays  may help decrease inflammation in your nose and sinuses      · Decongestants  help reduce swelling and drain mucus in the nose and sinuses  They may help you breathe easier       · Antihistamines  help dry mucus in the nose and relieve sneezing       · Antibiotics  help treat or prevent a bacterial infection      · Take your medicine as directed  Contact your healthcare provider if you think your medicine is not helping or if you have side effects  Tell him or her if you are allergic to any medicine  Keep a list of the medicines, vitamins, and herbs you take  Include the amounts, and when and why you take them  Bring the list or the pill bottles to follow-up visits  Carry your medicine list with you in case of an emergency      Self-care:   · Rinse your sinuses as directed  Use a sinus rinse device to rinse your nasal passages with a saline (salt water) solution or distilled water  Do not use tap water  This will help thin the mucus in your nose and rinse away pollen and dirt   It will also help reduce swelling so you can breathe normally      · Use a humidifier  to increase air moisture in your home  This may make it easier for you to breathe and help decrease your cough       · Sleep with your head elevated  Place an extra pillow under your head before you go to sleep to help your sinuses drain       · Drink liquids as directed  Ask your healthcare provider how much liquid to drink each day and which liquids are best for you  Liquids will thin the mucus in your nose and help it drain  Avoid drinks that contain alcohol or caffeine       · Do not smoke, and avoid secondhand smoke  Nicotine and other chemicals in cigarettes and cigars can make your symptoms worse  Ask your healthcare provider for information if you currently smoke and need help to quit  E-cigarettes or smokeless tobacco still contain nicotine  Talk to your healthcare provider before you use these products      Prevent the spread of germs:   · Wash your hands often with soap and water  Wash your hands after you use the bathroom, change a child's diaper, or sneeze  Wash your hands before you prepare or eat food           · Stay away from people who are sick  Some germs spread easily and quickly through contact      Follow up with your doctor as directed: You may be referred to an ear, nose, and throat specialist  Write down your questions so you remember to ask them during your visits  © Copyright [employer] 2022 Information is for End User's use only and may not be sold, redistributed or otherwise used for commercial purposes  All illustrations and images included in CareNotes® are the copyrighted property of [employer]  or Anuj [employer]  The above information is an  only  It is not intended as medical advice for individual conditions or treatments  Talk to your doctor, nurse or pharmacist before following any medical regimen to see if it is safe and effective for you            Follow up with PCP in 3-5 days  Proceed to  ER if symptoms worsen      Chief Complaint Chief Complaint   Patient presents with    Facial Pain     Patient states for the past week she has had sinus pressure as well as a sore throat  Took at home covid test and was negative  Has tried multiple OTC medication but none has helped  History of Present Illness       Patient is a 70-year-old female with past medical history significant for seasonal allergies, asthma who presents for evaluation of sinus pressure, mild sore throat and dry cough over the past week  She has been using Flonase, nasal rinses and Mucinex with some relief  She has taken an at home COVID test which was negative and she is vaccinated  She denies chest pain, palpitations, shortness of breath, wheezing, nausea/vomiting/diarrhea  Review of Systems   Review of Systems   Constitutional: Negative for chills, fatigue and fever  HENT: Positive for sinus pressure and sore throat  Negative for congestion, ear pain, postnasal drip, rhinorrhea, sinus pain and sneezing  Eyes: Negative for pain and visual disturbance  Respiratory: Positive for cough  Negative for apnea, choking, chest tightness, shortness of breath, wheezing and stridor  Cardiovascular: Negative for chest pain and palpitations  Gastrointestinal: Negative for abdominal pain, diarrhea, nausea and vomiting  Endocrine: Negative  Genitourinary: Negative for dysuria and hematuria  Musculoskeletal: Negative for arthralgias, back pain, myalgias, neck pain and neck stiffness  Skin: Negative for color change and rash  Allergic/Immunologic: Negative  Negative for environmental allergies  Neurological: Negative  Negative for dizziness, syncope, facial asymmetry, light-headedness, numbness and headaches  Hematological: Negative  Negative for adenopathy  Psychiatric/Behavioral: Negative  All other systems reviewed and are negative          Current Medications       Current Outpatient Medications:     albuterol (ProAir HFA) 90 mcg/act inhaler, Inhale 2 puffs every 4 (four) hours as needed for wheezing or shortness of breath, Disp: 8 5 g, Rfl: 2    brompheniramine-pseudoephedrine-DM 30-2-10 MG/5ML syrup, Take 5 mL by mouth 3 (three) times a day as needed for congestion or allergies, Disp: 120 mL, Rfl: 0    cetirizine (ZyrTEC) 10 mg tablet, Take 10 mg by mouth daily in the early morning, Disp: , Rfl:     fluticasone (FLONASE) 50 mcg/act nasal spray, 1 spray into each nostril daily, Disp: 16 g, Rfl: 2    LORazepam (ATIVAN) 0 5 mg tablet, Take 1 tablet (0 5 mg total) by mouth daily as needed for anxiety, Disp: 30 tablet, Rfl: 0    predniSONE 20 mg tablet, Take 1 tablet (20 mg total) by mouth 2 (two) times a day with meals for 5 days, Disp: 10 tablet, Rfl: 0    sertraline (ZOLOFT) 100 mg tablet, TAKE 1 TABLET BY MOUTH EVERY DAY, Disp: 30 tablet, Rfl: 2    sertraline (ZOLOFT) 50 mg tablet, TAKE ONE TABLET BY MOUTH EVERY DAY (WITH 100 MG FOR TOTAL 150 MG DAILY), Disp: 30 tablet, Rfl: 0    cyclobenzaprine (FLEXERIL) 5 mg tablet, Take 1 tablet (5 mg total) by mouth daily as needed for muscle spasms (Patient not taking: Reported on 9/16/2022), Disp: 14 tablet, Rfl: 0    Current Allergies     Allergies as of 09/16/2022 - Reviewed 09/16/2022   Allergen Reaction Noted    Bupropion Rash and Chest Pain 06/18/2020    Sulfa antibiotics Chest Pain and Shortness Of Breath 01/01/2014            The following portions of the patient's history were reviewed and updated as appropriate: allergies, current medications, past family history, past medical history, past social history, past surgical history and problem list      Past Medical History:   Diagnosis Date    Acute bronchitis with bronchospasm 2/6/2018    Acute non-recurrent pansinusitis 2/6/2018    Allergic 2009    Seasonal, sinus    Anxiety 2010    Asthma 2006    Depression 2008    GERD (gastroesophageal reflux disease) 2021    Occasional    Kidney damage     One Kidney has decreased function from previous damage    Other abnormality of red blood cells     PMS (premenstrual syndrome) 6/9/2021    Screening for heart disease 2/6/2018    Unilateral congenital vesico-uretero-renal reflux     left       Past Surgical History:   Procedure Laterality Date    EGD      MS HYSTEROSCOPY,W/ENDO BX N/A 6/22/2020    Procedure: HYSTEROSCOPY; BIOPSY (Ethyl Reason)  ;  Surgeon: Amada Vu DO;  Location: [facility];  Service: Gynecology    MS LAP,FULGURATE/EXCISE LESIONS N/A 6/22/2020    Procedure: LAPAROSCOPY; FULGERATION OF ENDOMETRIOSIS, POLYPECTOMY, DRAINAGE OF LEFT OVARIAN CYST;  Surgeon: Amada Vu DO;  Location: [facility];  Service: Gynecology    URETERAL REIMPLANTION Bilateral age 1   Alley Leisure WISDOM TOOTH EXTRACTION         Family History   Problem Relation Age of Onset    Cancer Maternal Grandfather         pancreatic    Depression Father     Crohn's disease Father     Anxiety disorder Father     Depression Mother         (undiagnosed)    Hyperthyroidism Mother         Graves    Bipolar disorder Maternal Aunt     Cancer Paternal Grandmother         blood cancer but not leukemia         Medications have been verified  Objective   /80   Pulse 65   Temp 97 8 °F (36 6 °C)   Resp 16   Ht 5' (1 524 m)   Wt 56 7 kg (125 lb)   SpO2 98%   BMI 24 41 kg/m²        Physical Exam     Physical Exam  Vitals and nursing note reviewed  Constitutional:       General: She is not in acute distress  Appearance: Normal appearance  She is not ill-appearing, toxic-appearing or diaphoretic  Interventions: She is not intubated  HENT:      Head: Normocephalic and atraumatic  Jaw: No trismus or tenderness  Right Ear: Hearing, tympanic membrane, ear canal and external ear normal  Tympanic membrane is not erythematous or bulging  Left Ear: Hearing, tympanic membrane, ear canal and external ear normal  Tympanic membrane is not erythematous or bulging        Nose: Nose normal  No congestion or rhinorrhea  Mouth/Throat:      Mouth: Mucous membranes are moist       Tongue: No lesions  Tongue does not deviate from midline  Palate: No mass and lesions  Pharynx: Oropharynx is clear  Uvula midline  No pharyngeal swelling, oropharyngeal exudate, posterior oropharyngeal erythema or uvula swelling  Tonsils: No tonsillar exudate or tonsillar abscesses  1+ on the right  1+ on the left  Eyes:      Extraocular Movements: Extraocular movements intact  Conjunctiva/sclera: Conjunctivae normal       Pupils: Pupils are equal, round, and reactive to light  Cardiovascular:      Rate and Rhythm: Normal rate and regular rhythm  Pulses: Normal pulses  Heart sounds: Normal heart sounds, S1 normal and S2 normal  Heart sounds not distant  No murmur heard  No friction rub  No gallop  Pulmonary:      Effort: Pulmonary effort is normal  No tachypnea, bradypnea, accessory muscle usage, prolonged expiration, respiratory distress or retractions  She is not intubated  Breath sounds: Normal breath sounds  No stridor, decreased air movement or transmitted upper airway sounds  No decreased breath sounds, wheezing, rhonchi or rales  Abdominal:      General: Bowel sounds are normal       Palpations: Abdomen is soft  Tenderness: There is no abdominal tenderness  There is no guarding or rebound  Musculoskeletal:         General: Normal range of motion  Cervical back: Normal range of motion and neck supple  No rigidity or tenderness  Lymphadenopathy:      Cervical: No cervical adenopathy  Skin:     General: Skin is warm and dry  Capillary Refill: Capillary refill takes less than 2 seconds  Neurological:      General: No focal deficit present  Mental Status: She is alert and oriented to person, place, and time  Cranial Nerves: No cranial nerve deficit     Psychiatric:         Mood and Affect: Mood normal          Behavior: Behavior normal

## 2022-09-30 DIAGNOSIS — F41.1 GENERALIZED ANXIETY DISORDER WITH PANIC ATTACKS: ICD-10-CM

## 2022-09-30 DIAGNOSIS — F33.0 MILD EPISODE OF RECURRENT MAJOR DEPRESSIVE DISORDER (HCC): ICD-10-CM

## 2022-09-30 DIAGNOSIS — F41.0 GENERALIZED ANXIETY DISORDER WITH PANIC ATTACKS: ICD-10-CM

## 2022-10-30 DIAGNOSIS — F33.0 MILD EPISODE OF RECURRENT MAJOR DEPRESSIVE DISORDER (HCC): ICD-10-CM

## 2022-10-30 DIAGNOSIS — F41.0 GENERALIZED ANXIETY DISORDER WITH PANIC ATTACKS: ICD-10-CM

## 2022-10-30 DIAGNOSIS — F41.1 GENERALIZED ANXIETY DISORDER WITH PANIC ATTACKS: ICD-10-CM

## 2022-11-29 DIAGNOSIS — F41.0 GENERALIZED ANXIETY DISORDER WITH PANIC ATTACKS: ICD-10-CM

## 2022-11-29 DIAGNOSIS — F33.0 MILD EPISODE OF RECURRENT MAJOR DEPRESSIVE DISORDER (HCC): ICD-10-CM

## 2022-11-29 DIAGNOSIS — F41.1 GENERALIZED ANXIETY DISORDER WITH PANIC ATTACKS: ICD-10-CM

## 2022-11-29 RX ORDER — SERTRALINE HYDROCHLORIDE 100 MG/1
TABLET, FILM COATED ORAL
Qty: 30 TABLET | Refills: 2 | Status: SHIPPED | OUTPATIENT
Start: 2022-11-29

## 2022-12-13 ENCOUNTER — OFFICE VISIT (OUTPATIENT)
Dept: INTERNAL MEDICINE CLINIC | Facility: CLINIC | Age: 27
End: 2022-12-13

## 2022-12-13 VITALS
WEIGHT: 126 LBS | HEIGHT: 60 IN | HEART RATE: 87 BPM | OXYGEN SATURATION: 98 % | SYSTOLIC BLOOD PRESSURE: 118 MMHG | BODY MASS INDEX: 24.74 KG/M2 | TEMPERATURE: 97.6 F | RESPIRATION RATE: 16 BRPM | DIASTOLIC BLOOD PRESSURE: 70 MMHG

## 2022-12-13 DIAGNOSIS — Z13.6 SCREENING FOR CARDIOVASCULAR CONDITION: ICD-10-CM

## 2022-12-13 DIAGNOSIS — Z11.4 SCREENING FOR HIV (HUMAN IMMUNODEFICIENCY VIRUS): ICD-10-CM

## 2022-12-13 DIAGNOSIS — F41.0 GENERALIZED ANXIETY DISORDER WITH PANIC ATTACKS: ICD-10-CM

## 2022-12-13 DIAGNOSIS — Z00.00 ANNUAL PHYSICAL EXAM: Primary | ICD-10-CM

## 2022-12-13 DIAGNOSIS — J30.1 SEASONAL ALLERGIC RHINITIS DUE TO POLLEN: ICD-10-CM

## 2022-12-13 DIAGNOSIS — Z11.59 NEED FOR HEPATITIS C SCREENING TEST: ICD-10-CM

## 2022-12-13 DIAGNOSIS — F41.1 GENERALIZED ANXIETY DISORDER WITH PANIC ATTACKS: ICD-10-CM

## 2022-12-13 DIAGNOSIS — F33.0 MILD EPISODE OF RECURRENT MAJOR DEPRESSIVE DISORDER (HCC): ICD-10-CM

## 2022-12-13 DIAGNOSIS — Z23 ENCOUNTER FOR IMMUNIZATION: ICD-10-CM

## 2022-12-13 RX ORDER — FLUTICASONE PROPIONATE 50 MCG
1 SPRAY, SUSPENSION (ML) NASAL DAILY
Qty: 16 G | Refills: 2 | Status: SHIPPED | OUTPATIENT
Start: 2022-12-13

## 2022-12-13 NOTE — PATIENT INSTRUCTIONS
Wellness Visit for Adults   AMBULATORY CARE:   A wellness visit  is when you see your healthcare provider to get screened for health problems  Your healthcare provider will also give you advice on how to stay healthy  Write down your questions so you remember to ask them  Ask your healthcare provider how often you should have a wellness visit  What happens at a wellness visit:  Your healthcare provider will ask about your health, and your family history of health problems  This includes high blood pressure, heart disease, and cancer  He or she will ask if you have symptoms that concern you, if you smoke, and about your mood  You may also be asked about your intake of medicines, supplements, food, and alcohol  Any of the following may be done:  · Your weight  will be checked  Your height may also be checked so your body mass index (BMI) can be calculated  Your BMI shows if you are at a healthy weight  · Your blood pressure  and heart rate will be checked  Your temperature may also be checked  · Blood and urine tests  may be done  Blood tests may be done to check your cholesterol levels  Abnormal cholesterol levels increase your risk for heart disease and stroke  You may also need a blood or urine test to check for diabetes if you are at increased risk  Urine tests may be done to look for signs of an infection or kidney disease  · A physical exam  includes checking your heartbeat and lungs with a stethoscope  Your healthcare provider may also check your skin to look for sun damage  · Screening tests  may be recommended  A screening test is done to check for diseases that may not cause symptoms  The screening tests you may need depend on your age, gender, family history, and lifestyle habits  For example, colorectal screening may be recommended if you are 48years old or older  Screening tests you need if you are a woman:   · A Pap smear  is used to screen for cervical cancer   Pap smears are usually done every 3 to 5 years depending on your age  You may need them more often if you have had abnormal Pap smear test results in the past  Ask your healthcare provider how often you should have a Pap smear  · A mammogram  is an x-ray of your breasts to screen for breast cancer  Experts recommend mammograms every 2 years starting at age 48 years  You may need a mammogram at age 52 years or younger if you have an increased risk for breast cancer  Talk to your healthcare provider about when you should start having mammograms and how often you need them  Vaccines you may need:   · Get an influenza vaccine  every year  The influenza vaccine protects you from the flu  Several types of viruses cause the flu  The viruses change over time, so new vaccines are made each year  · Get a tetanus-diphtheria (Td) booster vaccine  every 10 years  This vaccine protects you against tetanus and diphtheria  Tetanus is a severe infection that may cause painful muscle spasms and lockjaw  Diphtheria is a severe bacterial infection that causes a thick covering in the back of your mouth and throat  · Get a human papillomavirus (HPV) vaccine  if you are female and aged 23 to 32 or male 23 to 24 and never received it  This vaccine protects you from HPV infection  HPV is the most common infection spread by sexual contact  HPV may also cause vaginal, penile, and anal cancers  · Get a pneumococcal vaccine  if you are aged 72 years or older  The pneumococcal vaccine is an injection given to protect you from pneumococcal disease  Pneumococcal disease is an infection caused by pneumococcal bacteria  The infection may cause pneumonia, meningitis, or an ear infection  · Get a shingles vaccine  if you are 60 or older, even if you have had shingles before  The shingles vaccine is an injection to protect you from the varicella-zoster virus  This is the same virus that causes chickenpox   Shingles is a painful rash that develops in people who had chickenpox or have been exposed to the virus  How to eat healthy:  My Plate is a model for planning healthy meals  It shows the types and amounts of foods that should go on your plate  Fruits and vegetables make up about half of your plate, and grains and protein make up the other half  A serving of dairy is included on the side of your plate  The amount of calories and serving sizes you need depends on your age, gender, weight, and height  Examples of healthy foods are listed below:  · Eat a variety of vegetables  such as dark green, red, and orange vegetables  You can also include canned vegetables low in sodium (salt) and frozen vegetables without added butter or sauces  · Eat a variety of fresh fruits , canned fruit in 100% juice, frozen fruit, and dried fruit  · Include whole grains  At least half of the grains you eat should be whole grains  Examples include whole-wheat bread, wheat pasta, brown rice, and whole-grain cereals such as oatmeal     · Eat a variety of protein foods such as seafood (fish and shellfish), lean meat, and poultry without skin (turkey and chicken)  Examples of lean meats include pork leg, shoulder, or tenderloin, and beef round, sirloin, tenderloin, and extra lean ground beef  Other protein foods include eggs and egg substitutes, beans, peas, soy products, nuts, and seeds  · Choose low-fat dairy products such as skim or 1% milk or low-fat yogurt, cheese, and cottage cheese  · Limit unhealthy fats  such as butter, hard margarine, and shortening  Exercise:  Exercise at least 30 minutes per day on most days of the week  Some examples of exercise include walking, biking, dancing, and swimming  You can also fit in more physical activity by taking the stairs instead of the elevator or parking farther away from stores  Include muscle strengthening activities 2 days each week  Regular exercise provides many health benefits   It helps you manage your weight, and decreases your risk for type 2 diabetes, heart disease, stroke, and high blood pressure  Exercise can also help improve your mood  Ask your healthcare provider about the best exercise plan for you  General health and safety guidelines:   · Do not smoke  Nicotine and other chemicals in cigarettes and cigars can cause lung damage  Ask your healthcare provider for information if you currently smoke and need help to quit  E-cigarettes or smokeless tobacco still contain nicotine  Talk to your healthcare provider before you use these products  · Limit alcohol  A drink of alcohol is 12 ounces of beer, 5 ounces of wine, or 1½ ounces of liquor  · Lose weight, if needed  Being overweight increases your risk of certain health conditions  These include heart disease, high blood pressure, type 2 diabetes, and certain types of cancer  · Protect your skin  Do not sunbathe or use tanning beds  Use sunscreen with a SPF 15 or higher  Apply sunscreen at least 15 minutes before you go outside  Reapply sunscreen every 2 hours  Wear protective clothing, hats, and sunglasses when you are outside  · Drive safely  Always wear your seatbelt  Make sure everyone in your car wears a seatbelt  A seatbelt can save your life if you are in an accident  Do not use your cell phone when you are driving  This could distract you and cause an accident  Pull over if you need to make a call or send a text message  · Practice safe sex  Use latex condoms if are sexually active and have more than one partner  Your healthcare provider may recommend screening tests for sexually transmitted infections (STIs)  · Wear helmets, lifejackets, and protective gear  Always wear a helmet when you ride a bike or motorcycle, go skiing, or play sports that could cause a head injury  Wear protective equipment when you play sports  Wear a lifejacket when you are on a boat or doing water sports      © Copyright [employer] 2022 Information is for End User's use only and may not be sold, redistributed or otherwise used for commercial purposes  All illustrations and images included in CareNotes® are the copyrighted property of [employer]  or [employer]  The above information is an  only  It is not intended as medical advice for individual conditions or treatments  Talk to your doctor, nurse or pharmacist before following any medical regimen to see if it is safe and effective for you  Cholesterol and Your Health   AMBULATORY CARE:   Cholesterol  is a waxy, fat-like substance  Your body uses cholesterol to make hormones and new cells, and to protect nerves  Cholesterol is made by your body  It also comes from certain foods you eat, such as meat and dairy products  Your healthcare provider can help you set goals for your cholesterol levels  He or she can help you create a plan to meet your goals  Cholesterol level goals: Your cholesterol level goals depend on your risk for heart disease, your age, and your other health conditions  The following are general guidelines:  · Total cholesterol  includes low-density lipoprotein (LDL), high-density lipoprotein (HDL), and triglyceride levels  The total cholesterol level should be lower than 200 mg/dL and is best at about 150 mg/dL  · LDL cholesterol  is called bad cholesterol  because it forms plaque in your arteries  As plaque builds up, your arteries become narrow, and less blood flows through  When plaque decreases blood flow to your heart, you may have chest pain  If plaque completely blocks an artery that brings blood to your heart, you may have a heart attack  Plaque can break off and form blood clots  Blood clots may block arteries in your brain and cause a stroke  The level should be less than 130 mg/dL and is best at about 100 mg/dL  · HDL cholesterol  is called good cholesterol  because it helps remove LDL cholesterol from your arteries   It does this by attaching to LDL cholesterol and carrying it to your liver  Your liver breaks down LDL cholesterol so your body can get rid of it  High levels of HDL cholesterol can help prevent a heart attack and stroke  Low levels of HDL cholesterol can increase your risk for heart disease, heart attack, and stroke  The level should be 60 mg/dL or higher  · Triglycerides  are a type of fat that store energy from foods you eat  High levels of triglycerides also cause plaque buildup  This can increase your risk for a heart attack or stroke  If your triglyceride level is high, your LDL cholesterol level may also be high  The level should be less than 150 mg/dL  Any of the following can increase your risk for high cholesterol:   · Smoking cigarettes    · Being overweight or obese, or not getting enough exercise    · Drinking large amounts of alcohol    · A medical condition such as hypertension (high blood pressure) or diabetes    · Certain genes passed from your parents to you    · Age older than 65 years    What you need to know about having your cholesterol levels checked: Adults 21to 39years of age should have their cholesterol levels checked every 4 to 6 years  Adults 45 years or older should have their cholesterol checked every 1 to 2 years  You may need your cholesterol checked more often, or at a younger age, if you have risk factors for heart disease  You may also need to have your cholesterol checked more often if you have other health conditions, such as diabetes  Blood tests are used to check cholesterol levels  Blood tests measure your levels of triglycerides, LDL cholesterol, and HDL cholesterol  How healthy fats affect your cholesterol levels:  Healthy fats, also called unsaturated fats, help lower LDL cholesterol and triglyceride levels  Healthy fats include the following:  · Monounsaturated fats  are found in foods such as olive oil, canola oil, avocado, nuts, and olives      · Polyunsaturated fats,  such as omega 3 fats, are found in fish, such as salmon, trout, and tuna  They can also be found in plant foods such as flaxseed, walnuts, and soybeans  How unhealthy fats affect your cholesterol levels:  Unhealthy fats increase LDL cholesterol and triglyceride levels  They are found in foods high in cholesterol, saturated fat, and trans fat:  · Cholesterol  is found in eggs, dairy, and meat  · Saturated fat  is found in butter, cheese, ice cream, whole milk, and coconut oil  Saturated fat is also found in meat, such as sausage, hot dogs, and bologna  · Trans fat  is found in liquid oils and is used in fried and baked foods  Foods that contain trans fats include chips, crackers, muffins, sweet rolls, microwave popcorn, and cookies  Treatment  for high cholesterol will also decrease your risk of heart disease, heart attack, and stroke  Treatment may include any of the following:  · Lifestyle changes  may include food, exercise, weight loss, and quitting smoking  You may also need to decrease the amount of alcohol you drink  Your healthcare provider will want you to start with lifestyle changes  Other treatment may be added if lifestyle changes are not enough  Your healthcare provider may recommend you work with a team to manage hyperlipidemia  The team may include medical experts such as a dietitian, an exercise or physical therapist, and a behavior therapist  Your family members may be included in helping you create lifestyle changes  · Medicines  may be given to lower your LDL cholesterol, triglyceride levels, or total cholesterol level  You may need medicines to lower your cholesterol if any of the following is true:    ? You have a history of stroke, TIA, unstable angina, or a heart attack  ? Your LDL cholesterol level is 190 mg/dL or higher  ? You are age 36 to 76 years, have diabetes or heart disease risk factors, and your LDL cholesterol is 70 mg/dL or higher      · Supplements  include fish oil, red yeast rice, and garlic  Fish oil may help lower your triglyceride and LDL cholesterol levels  It may also increase your HDL cholesterol level  Red yeast rice may help decrease your total cholesterol level and LDL cholesterol level  Garlic may help lower your total cholesterol level  Do not take any supplements without talking to your healthcare provider  Food changes you can make to lower your cholesterol levels:  A dietitian can help you create a healthy eating plan  He or she can show you how to read food labels and choose foods low in saturated fat, trans fats, and cholesterol  · Decrease the total amount of fat you eat  Choose lean meats, fat-free or 1% fat milk, and low-fat dairy products, such as yogurt and cheese  Try to limit or avoid red meats  Limit or do not eat fried foods or baked goods, such as cookies  · Replace unhealthy fats with healthy fats  Cook foods in olive oil or canola oil  Choose soft margarines that are low in saturated fat and trans fat  Seeds, nuts, and avocados are other examples of healthy fats  · Eat foods with omega-3 fats  Examples include salmon, tuna, mackerel, walnuts, and flaxseed  Eat fish 2 times per week  Pregnant women should not eat fish that have high levels of mercury, such as shark, swordfish, and svetlana mackerel  · Increase the amount of high-fiber foods you eat  High-fiber foods can help lower your LDL cholesterol  Aim to get between 20 and 30 grams of fiber each day  Fruits and vegetables are high in fiber  Eat at least 5 servings each day  Other high-fiber foods are whole-grain or whole-wheat breads, pastas, or cereals, and brown rice  Eat 3 ounces of whole-grain foods each day  Increase fiber slowly  You may have abdominal discomfort, bloating, and gas if you add fiber to your diet too quickly  · Eat healthy protein foods  Examples include low-fat dairy products, skinless chicken and turkey, fish, and nuts      · Limit foods and drinks that are high in sugar  Your dietitian or healthcare provider can help you create daily limits for high-sugar foods and drinks  The limit may be lower if you have diabetes or another health condition  Limits can also help you lose weight if needed  Lifestyle changes you can make to lower your cholesterol levels:   · Maintain a healthy weight  Ask your healthcare provider what a healthy weight is for you  Ask him or her to help you create a weight loss plan if needed  Weight loss can decrease your total cholesterol and triglyceride levels  Weight loss may also help keep your blood pressure at a healthy level  · Be physically active throughout the day  Physical activity, such as exercise, can help lower your total cholesterol level and maintain a healthy weight  Physical activity can also help increase your HDL cholesterol level  Work with your healthcare provider to create an program that is right for you  Get at least 30 to 40 minutes of moderate physical activity most days of the week  Examples of exercise include brisk walking, swimming, or biking  Also include strength training at least 2 times each week  Your healthcare providers can help you create a physical activity plan  · Do not smoke  Nicotine and other chemicals in cigarettes and cigars can raise your cholesterol levels  Ask your healthcare provider for information if you currently smoke and need help to quit  E-cigarettes or smokeless tobacco still contain nicotine  Talk to your healthcare provider before you use these products  · Limit or do not drink alcohol  Alcohol can increase your triglyceride levels  Ask your healthcare provider before you drink alcohol  Ask how much is okay for you to drink in 24 hours or 1 week  Follow up with your doctor as directed:  Write down your questions so you remember to ask them during your visits    © Copyright [employer] 2022 Information is for End User's use only and may not be sold, redistributed or otherwise used for commercial purposes  All illustrations and images included in CareNotes® are the copyrighted property of [employer]  or [employer]  The above information is an  only  It is not intended as medical advice for individual conditions or treatments  Talk to your doctor, nurse or pharmacist before following any medical regimen to see if it is safe and effective for you

## 2022-12-13 NOTE — PROGRESS NOTES
[facility] INTERNAL MEDICINE    NAME: Jessica Moe  AGE: 32 y o  SEX: female  : 1995     DATE: 2022     Assessment and Plan:     Problem List Items Addressed This Visit        Respiratory    Seasonal allergic rhinitis due to pollen    Relevant Medications    fluticasone (FLONASE) 50 mcg/act nasal spray       Other    Mild episode of recurrent major depressive disorder (HCC)    Relevant Orders    Basic metabolic panel    Generalized anxiety disorder with panic attacks    Relevant Orders    Basic metabolic panel   Other Visit Diagnoses     Annual physical exam    -  Primary    Screening for cardiovascular condition        Relevant Orders    Lipid panel    Screening for HIV (human immunodeficiency virus)        Relevant Orders    HIV 1/2 Antigen/Antibody (4th Generation) w Reflex [facility]    Need for hepatitis C screening test        Relevant Orders    Hepatitis C antibody    Encounter for immunization        Relevant Orders    influenza vaccine, quadrivalent, 0 5 mL, preservative-free, for adult and pediatric patients 6 mos+ (AFLURIA, FLUARIX, FLULAVAL, FLUZONE) (Completed)          Immunizations and preventive care screenings were discussed with patient today  Appropriate education was printed on patient's after visit summary  Counseling:  Alcohol/drug use: discussed moderation in alcohol intake, the recommendations for healthy alcohol use, and avoidance of illicit drug use  Dental Health: discussed importance of regular tooth brushing, flossing, and dental visits  Exercise: the importance of regular exercise/physical activity was discussed  Recommend exercise 3-5 times per week for at least 30 minutes  Cancer screenings discussed  PAP per GYN  Immunizations discussed  Influenza vaccine due today  She is up to date with tdap, COVID and HPV series  Return in about 6 months (around 2023) for Recheck  Chief Complaint:     Chief Complaint   Patient presents with   • Physical Exam      History of Present Illness:     Adult Annual Physical   Patient with asthma, OCD, TMJ, bruxism, JOSEF with panic, MDD here for a comprehensive physical exam   She is getting  [place]21[place]23 [place] on [facility]    She has not had panic attacks  Uses oral appliance for TMJ  Diet and Physical Activity  Diet/Nutrition: regular diet  Exercise: no formal exercise  Depression Screening  PHQ-2/9 Depression Screening    Little interest or pleasure in doing things: 0 - not at all  Feeling down, depressed, or hopeless: 0 - not at all  Trouble falling or staying asleep, or sleeping too much: 1 - several days  Feeling tired or having little energy: 1 - several days  Poor appetite or overeatin - several days  Feeling bad about yourself - or that you are a failure or have let yourself or your family down: 0 - not at all  Trouble concentrating on things, such as reading the newspaper or watching television: 0 - not at all  Moving or speaking so slowly that other people could have noticed  Or the opposite - being so fidgety or restless that you have been moving around a lot more than usual: 0 - not at all  Thoughts that you would be better off dead, or of hurting yourself in some way: 0 - not at all  PHQ-9 Score: 3   PHQ-9 Interpretation: No or Minimal depression        General Health  Sleep: gets 7-8 hours of sleep on average  Denies snoring  Hearing: normal - none   Vision: most recent eye exam <1 year ago and wears glasses  Dental: regular dental visits  Goes to [employer] in [facility]  /GYN [facility]  Last menstrual period: 22  Contraceptive method: none  History of STDs?: no      Review of Systems:     Review of Systems   Constitutional: Positive for fatigue  Negative for activity change, appetite change and unexpected weight change  HENT: Positive for postnasal drip and rhinorrhea           L jaw pain   Respiratory: Negative for cough, chest tightness, shortness of breath and wheezing  Cardiovascular: Negative for chest pain, palpitations and leg swelling  Gastrointestinal: Negative for abdominal pain, constipation, diarrhea, nausea and vomiting  Genitourinary: Negative for difficulty urinating  Musculoskeletal: Negative for arthralgias  Neurological: Positive for headaches (less frequent)  Negative for dizziness and numbness  Psychiatric/Behavioral: Positive for dysphoric mood  Negative for sleep disturbance  The patient is nervous/anxious  Past Medical History:     Past Medical History:   Diagnosis Date   • Acute bronchitis with bronchospasm 2/6/2018   • Acute non-recurrent pansinusitis 2/6/2018   • Allergic 2009    Seasonal, sinus   • Anxiety 2010   • Asthma 2006   • Depression 2008   • GERD (gastroesophageal reflux disease) 2021    Occasional   • Kidney damage     One Kidney has decreased function from previous damage   • Other abnormality of red blood cells    • PMS (premenstrual syndrome) 6/9/2021   • Screening for heart disease 2/6/2018   • Unilateral congenital vesico-uretero-renal reflux     left      Past Surgical History:     Past Surgical History:   Procedure Laterality Date   • EGD     • OH HYSTEROSCOPY BX ENDOMETRIUM&/POLYPC W/WO D&C N/A 6/22/2020    Procedure: HYSTEROSCOPY; BIOPSY (Selena Argueta)  ;  Surgeon: Helene Walls DO;  [facility];  Service: Gynecology   • OH LAPS FULG/EXC OVARY VISCERA/PERITONEAL SURFACE N/A 6/22/2020    Procedure: LAPAROSCOPY; FULGERATION OF ENDOMETRIOSIS, POLYPECTOMY, DRAINAGE OF LEFT OVARIAN CYST;  Surgeon: Helene Walls DO;  [facility];  Service: Gynecology   • URETERAL REIMPLANTION Bilateral age 1   • [de-identified] TOOTH EXTRACTION        Social History:     Social History     Socioeconomic History   • Marital status: Single     Spouse name: None   • Number of children: None   • Years of education: None   • Highest education level: None   Occupational History   • None   Tobacco Use   • Smoking status: Former     Packs/day: 0 00     Years: 3 00     Pack years: 0 00     Types: Cigarettes     Quit date:      Years since quittin 9   • Smokeless tobacco: Never   Vaping Use   • Vaping Use: Never used   Substance and Sexual Activity   • Alcohol use:  Yes     Alcohol/week: 5 0 standard drinks     Types: 5 Cans of beer per week     Comment: Weekends only   • Drug use: No   • Sexual activity: Yes     Partners: Male     Birth control/protection: Coitus interruptus, Condom Male   Other Topics Concern   • None   Social History Narrative         Social Determinants of Health     Financial Resource Strain: Not on file   Food Insecurity: Not on file   Transportation Needs: Not on file   Physical Activity: Not on file   Stress: Not on file   Social Connections: Not on file   Intimate Partner Violence: Not on file   Housing Stability: Not on file      Family History:     Family History   Problem Relation Age of Onset   • Cancer Maternal Grandfather         pancreatic   • Depression Father    • Crohn's disease Father    • Anxiety disorder Father    • Depression Mother         (undiagnosed)   • Hyperthyroidism Mother         Graves   • Bipolar disorder Maternal Aunt    • Cancer Paternal Grandmother         blood cancer but not leukemia      Current Medications:     Current Outpatient Medications   Medication Sig Dispense Refill   • albuterol (ProAir HFA) 90 mcg/act inhaler Inhale 2 puffs every 4 (four) hours as needed for wheezing or shortness of breath 8 5 g 2   • cetirizine (ZyrTEC) 10 mg tablet Take 10 mg by mouth daily in the early morning     • fluticasone (FLONASE) 50 mcg/act nasal spray 1 spray into each nostril daily 16 g 2   • LORazepam (ATIVAN) 0 5 mg tablet Take 1 tablet (0 5 mg total) by mouth daily as needed for anxiety 30 tablet 0   • sertraline (ZOLOFT) 100 mg tablet TAKE ONE TABLET BY MOUTH EVERY DAY 30 tablet 2   • sertraline (ZOLOFT) 50 mg tablet TAKE ONE TABLET BY MOUTH EVERY DAY (WITH 100MG TABLET FOR TOTAL OF 150MG DAILY) 30 tablet 0   • cyclobenzaprine (FLEXERIL) 5 mg tablet Take 1 tablet (5 mg total) by mouth daily as needed for muscle spasms (Patient not taking: Reported on 9/16/2022) 14 tablet 0     No current facility-administered medications for this visit  Allergies: Allergies   Allergen Reactions   • Bupropion Rash and Chest Pain   • Sulfa Antibiotics Chest Pain and Shortness Of Breath      Physical Exam:     /70   Pulse 87   Temp 97 6 °F (36 4 °C)   Resp 16   Ht 5' (1 524 m)   Wt 57 2 kg (126 lb)   SpO2 98%   BMI 24 61 kg/m²     Physical Exam  Vitals reviewed  Constitutional:       General: She is not in acute distress  HENT:      Head: Normocephalic  Right Ear: Tympanic membrane and ear canal normal  There is no impacted cerumen  Left Ear: Tympanic membrane and ear canal normal  There is no impacted cerumen  Nose: Nose normal  No congestion or rhinorrhea  Mouth/Throat:      Mouth: Mucous membranes are moist       Pharynx: Oropharynx is clear  No oropharyngeal exudate or posterior oropharyngeal erythema  Eyes:      Extraocular Movements: Extraocular movements intact  Conjunctiva/sclera: Conjunctivae normal       Comments: Wears glasses   Neck:      Thyroid: No thyromegaly or thyroid tenderness  Cardiovascular:      Rate and Rhythm: Normal rate and regular rhythm  Pulses: Normal pulses  Heart sounds: Normal heart sounds  Pulmonary:      Effort: Pulmonary effort is normal  No respiratory distress  Breath sounds: Normal breath sounds  No wheezing  Abdominal:      General: Bowel sounds are normal  There is no distension  Palpations: Abdomen is soft  Tenderness: There is no abdominal tenderness  Musculoskeletal:      Cervical back: Neck supple  No tenderness  Right lower leg: No edema  Left lower leg: No edema  Lymphadenopathy:      Cervical: No cervical adenopathy  Skin:     Coloration: Skin is not pale  Comments: BL nasal piercings  Multiple tattoos present   Neurological:      General: No focal deficit present  Mental Status: She is alert and oriented to person, place, and time     Psychiatric:         Mood and Affect: Mood normal           Jessica [facility] [facility] INTERNAL MEDICINE

## 2022-12-29 DIAGNOSIS — F33.0 MILD EPISODE OF RECURRENT MAJOR DEPRESSIVE DISORDER (HCC): ICD-10-CM

## 2022-12-29 DIAGNOSIS — F41.0 GENERALIZED ANXIETY DISORDER WITH PANIC ATTACKS: ICD-10-CM

## 2022-12-29 DIAGNOSIS — F41.1 GENERALIZED ANXIETY DISORDER WITH PANIC ATTACKS: ICD-10-CM

## 2023-02-04 DIAGNOSIS — F41.1 GENERALIZED ANXIETY DISORDER WITH PANIC ATTACKS: ICD-10-CM

## 2023-02-04 DIAGNOSIS — F33.0 MILD EPISODE OF RECURRENT MAJOR DEPRESSIVE DISORDER (HCC): ICD-10-CM

## 2023-02-04 DIAGNOSIS — F41.0 GENERALIZED ANXIETY DISORDER WITH PANIC ATTACKS: ICD-10-CM

## 2023-02-06 ENCOUNTER — OFFICE VISIT (OUTPATIENT)
Dept: GASTROENTEROLOGY | Facility: AMBULARY SURGERY CENTER | Age: 28
End: 2023-02-06

## 2023-02-06 VITALS
HEIGHT: 60 IN | BODY MASS INDEX: 25.6 KG/M2 | HEART RATE: 75 BPM | OXYGEN SATURATION: 97 % | DIASTOLIC BLOOD PRESSURE: 74 MMHG | WEIGHT: 130.4 LBS | SYSTOLIC BLOOD PRESSURE: 118 MMHG

## 2023-02-06 DIAGNOSIS — R10.10 PAIN OF UPPER ABDOMEN: Primary | ICD-10-CM

## 2023-02-06 RX ORDER — PANTOPRAZOLE SODIUM 40 MG/1
TABLET, DELAYED RELEASE ORAL
COMMUNITY
Start: 2023-01-31 | End: 2023-02-06 | Stop reason: SDUPTHER

## 2023-02-06 RX ORDER — SUCRALFATE 1 G/1
TABLET ORAL
COMMUNITY
Start: 2023-01-30

## 2023-02-06 RX ORDER — PANTOPRAZOLE SODIUM 40 MG/1
40 TABLET, DELAYED RELEASE ORAL
Qty: 90 TABLET | Refills: 3 | Status: SHIPPED | OUTPATIENT
Start: 2023-02-06

## 2023-02-06 NOTE — PROGRESS NOTES
[facility] - Outpatient Consultation  Kym Cook Page 32 y o  female MRN: [id number]  Encounter: [id number]      PCP: Neftali Armstrong DO  Referring: No referring provider defined for this encounter  ASSESSMENT AND PLAN:      1  Pain of upper abdomen  Suspect GERD versus functional dyspepsia versus biliary versus other  Complete abdominal ultrasound is unremarkable  Counseled on limiting NSAIDs  Continue Protonix 40 mg daily, attempt to hold 2 weeks prior to EGD to assess for objective evidence of GERD  Discussed anti-reflux measures, and offered handout detailing, including weight loss, avoidance of lying down after meals, recognize/avoid trigger foods, and elevating the head of bed    - NM hepatobiliary w rx; Future, r/o biliary dyskinesia  - EGD; Future, assess for erosive GERD, rule out H  pylori or celiac disease    ______________________________________________________________________    CC:  Chief Complaint   Patient presents with   • Advice Only     Patient is having ongoing upper abdomin pain,, throat pain and back pain  She states she has been taking pantoprazole having slight relief  HPI:      Patient is a 25-year-old female referred for abdominal pain  She has generalized anxiety disorder with panic attacks, major depressive disorder, OCD, seasonal allergic rhinitis, tension headaches, endometriosis  She describes 2 weeks of upper abdominal pain  She describes burning sensation which radiates up into her throat, radiates into her back and shoulder  Symptoms are worse in the morning, worse throughout the day, and increase when lying down  Mostly in her epigastrium and right upper quadrant  She is prescribed Protonix 40 mg, and Carafate QID after therapeutic failure of pepcid and omeprazole 20 mg  This has decreased the frequency and severity of her symptoms  She also describes associated globus sensation  Symptoms are better with intake of food   She underwent complete abdominal ultrasound in June 2022, with no abnormalities  She has also undergone outside EGD approximately 2 years ago, and was prescribed the omeprazole 20 mg at that time, but stopped it given resolution of her symptoms  REVIEW OF SYSTEMS:    CONSTITUTIONAL: Denies any fever, chills, rigors, and weight loss  HEENT: No earache or tinnitus  Denies hearing loss or visual disturbances  CARDIOVASCULAR: No chest pain or palpitations  RESPIRATORY: Denies any cough, hemoptysis, shortness of breath or dyspnea on exertion  GASTROINTESTINAL: As noted in the History of Present Illness  GENITOURINARY: No problems with urination  Denies any hematuria or dysuria  NEUROLOGIC: No dizziness or vertigo, denies headaches  MUSCULOSKELETAL: Denies any muscle or joint pain  SKIN: Denies skin rashes or itching  ENDOCRINE: Denies excessive thirst  Denies intolerance to heat or cold  PSYCHOSOCIAL: Denies depression or anxiety  Denies any recent memory loss  Historical Information   Past Medical History:   Diagnosis Date   • Acute bronchitis with bronchospasm 2/6/2018   • Acute non-recurrent pansinusitis 2/6/2018   • Allergic 2009    Seasonal, sinus   • Anxiety 2010   • Asthma 2006   • Depression 2008   • GERD (gastroesophageal reflux disease) 2021    Occasional   • Kidney damage     One Kidney has decreased function from previous damage   • Other abnormality of red blood cells    • PMS (premenstrual syndrome) 6/9/2021   • Screening for heart disease 2/6/2018   • Unilateral congenital vesico-uretero-renal reflux     left     Past Surgical History:   Procedure Laterality Date   • EGD     • [place] HYSTEROSCOPY BX ENDOMETRIUM&/POLYPC W/WO D&C N/A 6/22/2020    Procedure: HYSTEROSCOPY; BIOPSY (Flip Gómez)  ;  Surgeon: Vaibhav Toney DO;  [facility];  Service: Gynecology   • NV LAPS FULG/EXC OVARY VISCERA/PERITONEAL SURFACE N/A 6/22/2020    Procedure: LAPAROSCOPY; FULGERATION OF ENDOMETRIOSIS, POLYPECTOMY, DRAINAGE OF LEFT OVARIAN CYST;  Surgeon: Ramesh Wing DO;  [facility];  Service: Gynecology   • URETERAL REIMPLANTION Bilateral age 1   • [de-identified] TOOTH EXTRACTION       Social History   Social History     Substance and Sexual Activity   Alcohol Use Yes   • Alcohol/week: 5 0 standard drinks   • Types: 5 Cans of beer per week    Comment: Weekends only     Social History     Substance and Sexual Activity   Drug Use No     Social History     Tobacco Use   Smoking Status Former   • Packs/day: 0 00   • Years: 3 00   • Pack years: 0 00   • Types: Cigarettes   • Quit date:    • Years since quittin 1   Smokeless Tobacco Never     Family History   Problem Relation Age of Onset   • Cancer Maternal Grandfather         pancreatic   • Depression Father    • Crohn's disease Father    • Anxiety disorder Father    • Depression Mother         (undiagnosed)   • Hyperthyroidism Mother         Graves   • Bipolar disorder Maternal Aunt    • Cancer Paternal Grandmother         blood cancer but not leukemia       Meds/Allergies       Current Outpatient Medications:   •  albuterol (ProAir HFA) 90 mcg/act inhaler  •  cetirizine (ZyrTEC) 10 mg tablet  •  fluticasone (FLONASE) 50 mcg/act nasal spray  •  LORazepam (ATIVAN) 0 5 mg tablet  •  pantoprazole (PROTONIX) 40 mg tablet  •  sertraline (ZOLOFT) 100 mg tablet  •  sertraline (ZOLOFT) 50 mg tablet  •  sucralfate (CARAFATE) 1 g tablet  •  cyclobenzaprine (FLEXERIL) 5 mg tablet    Allergies   Allergen Reactions   • Bupropion Rash and Chest Pain   • Sulfa Antibiotics Chest Pain and Shortness Of Breath           Objective     Blood pressure 118/74, pulse 75, height 5' (1 524 m), weight 59 1 kg (130 lb 6 4 oz), SpO2 97 %  Body mass index is 25 47 kg/m²  PHYSICAL EXAM:      General Appearance:   Alert, cooperative, no distress   HEENT:   Normocephalic, atraumatic, anicteric       Neck:  Supple, symmetrical, trachea midline   Lungs:   Clear to auscultation bilaterally; no rales, rhonchi or wheezing; respirations unlabored    Heart[de-identified]   Regular rate and rhythm; no murmur, rub, or gallop  Abdomen:   Soft, non-tender, non-distended; normal bowel sounds; no masses, no organomegaly    Genitalia:   Deferred    Rectal:   Deferred    Extremities:  No cyanosis, clubbing or edema    Pulses:  2+ and symmetric    Skin:  No jaundice, rashes, or lesions    Lymph nodes:  No palpable cervical lymphadenopathy        Lab Results:     No results found for: WBC, HGB, HCT, MCV, PLT    No results found for: NA, K, CL, CO2, ANIONGAP, BUN, CREATININE, GLUCOSE, GLUF, CALCIUM, CORRECTEDCA, AST, ALT, ALKPHOS, PROT, BILITOT, EGFR    No results found for: INR, PROTIME      Radiology Results:   No results found  Portions of the record may have been created with voice recognition software  Occasional wrong word or "sound a like" substitutions may have occurred due to the inherent limitations of voice recognition software  Read the chart carefully and recognize, using context, where substitutions have occurred

## 2023-02-06 NOTE — PATIENT INSTRUCTIONS
Scheduled date of EGD(as of today):  04/24/23  Physician performing EGD: dr Melvin Ramey  Location of EGD: [facility]  Instructions reviewed with patient by: alex bojorquez  Clearances:  n/a Nurse Neonatologist

## 2023-03-06 DIAGNOSIS — F41.0 GENERALIZED ANXIETY DISORDER WITH PANIC ATTACKS: ICD-10-CM

## 2023-03-06 DIAGNOSIS — F33.0 MILD EPISODE OF RECURRENT MAJOR DEPRESSIVE DISORDER (HCC): ICD-10-CM

## 2023-03-06 DIAGNOSIS — F41.1 GENERALIZED ANXIETY DISORDER WITH PANIC ATTACKS: ICD-10-CM

## 2023-03-06 RX ORDER — SERTRALINE HYDROCHLORIDE 100 MG/1
TABLET, FILM COATED ORAL
Qty: 30 TABLET | Refills: 2 | Status: SHIPPED | OUTPATIENT
Start: 2023-03-06

## 2023-03-13 LAB
BUN SERPL-MCNC: 14 MG/DL (ref 7–25)
BUN/CREAT SERPL: NORMAL (CALC) (ref 6–22)
CALCIUM SERPL-MCNC: 9.3 MG/DL (ref 8.6–10.2)
CHLORIDE SERPL-SCNC: 104 MMOL/L (ref 98–110)
CHOLEST SERPL-MCNC: 169 MG/DL
CHOLEST/HDLC SERPL: 2.7 (CALC)
CO2 SERPL-SCNC: 30 MMOL/L (ref 20–32)
CREAT SERPL-MCNC: 0.94 MG/DL (ref 0.5–0.96)
GFR/BSA.PRED SERPLBLD CYS-BASED-ARV: 85 ML/MIN/1.73M2
GLUCOSE SERPL-MCNC: 89 MG/DL (ref 65–99)
HCV AB S/CO SERPL IA: <0.02
HCV AB SERPL QL IA: NORMAL
HDLC SERPL-MCNC: 63 MG/DL
HIV 1+2 AB+HIV1 P24 AG SERPL QL IA: NORMAL
LDLC SERPL CALC-MCNC: 90 MG/DL (CALC)
NONHDLC SERPL-MCNC: 106 MG/DL (CALC)
POTASSIUM SERPL-SCNC: 4.1 MMOL/L (ref 3.5–5.3)
SODIUM SERPL-SCNC: 141 MMOL/L (ref 135–146)
TRIGL SERPL-MCNC: 72 MG/DL

## 2023-04-05 ENCOUNTER — HOSPITAL ENCOUNTER (OUTPATIENT)
Dept: RADIOLOGY | Facility: HOSPITAL | Age: 28
Discharge: HOME | End: 2023-04-05
Attending: INTERNAL MEDICINE

## 2023-04-05 DIAGNOSIS — R10.10 PAIN OF UPPER ABDOMEN: ICD-10-CM

## 2023-04-05 DIAGNOSIS — F41.0 GENERALIZED ANXIETY DISORDER WITH PANIC ATTACKS: ICD-10-CM

## 2023-04-05 DIAGNOSIS — F41.1 GENERALIZED ANXIETY DISORDER WITH PANIC ATTACKS: ICD-10-CM

## 2023-04-05 DIAGNOSIS — F33.0 MILD EPISODE OF RECURRENT MAJOR DEPRESSIVE DISORDER (HCC): ICD-10-CM

## 2023-04-05 RX ORDER — WATER 1000 ML/1000ML
INJECTION, SOLUTION INTRAVENOUS
Status: COMPLETED
Start: 2023-04-05 | End: 2023-04-05

## 2023-04-05 RX ADMIN — WATER 5 ML: 1 INJECTION INTRAMUSCULAR; INTRAVENOUS; SUBCUTANEOUS at 12:53

## 2023-04-05 RX ADMIN — SINCALIDE 1.2 MCG: 5 INJECTION, POWDER, LYOPHILIZED, FOR SOLUTION INTRAVENOUS at 13:16

## 2023-04-07 ENCOUNTER — PREP FOR PROCEDURE (OUTPATIENT)
Dept: GASTROENTEROLOGY | Facility: AMBULARY SURGERY CENTER | Age: 28
End: 2023-04-07

## 2023-04-07 DIAGNOSIS — R10.10 PAIN OF UPPER ABDOMEN: ICD-10-CM

## 2023-04-07 DIAGNOSIS — R14.0 ABDOMINAL BLOATING: Primary | ICD-10-CM

## 2023-04-07 DIAGNOSIS — R19.4 CHANGE IN BOWEL HABITS: ICD-10-CM

## 2023-04-07 RX ORDER — PANTOPRAZOLE SODIUM 40 MG/1
40 TABLET, DELAYED RELEASE ORAL
Qty: 90 TABLET | Refills: 3 | Status: SHIPPED | OUTPATIENT
Start: 2023-04-07

## 2023-04-24 ENCOUNTER — HOSPITAL ENCOUNTER (OUTPATIENT)
Dept: GASTROENTEROLOGY | Facility: AMBULARY SURGERY CENTER | Age: 28
Setting detail: OUTPATIENT SURGERY
Discharge: HOME | End: 2023-04-24
Attending: INTERNAL MEDICINE

## 2023-04-24 ENCOUNTER — ANESTHESIA EVENT (OUTPATIENT)
Dept: GASTROENTEROLOGY | Facility: AMBULARY SURGERY CENTER | Age: 28
End: 2023-04-24

## 2023-04-24 ENCOUNTER — ANESTHESIA (OUTPATIENT)
Dept: GASTROENTEROLOGY | Facility: AMBULARY SURGERY CENTER | Age: 28
End: 2023-04-24

## 2023-04-24 VITALS
RESPIRATION RATE: 18 BRPM | HEIGHT: 59 IN | TEMPERATURE: 97 F | OXYGEN SATURATION: 99 % | BODY MASS INDEX: 25.8 KG/M2 | SYSTOLIC BLOOD PRESSURE: 111 MMHG | WEIGHT: 128 LBS | DIASTOLIC BLOOD PRESSURE: 74 MMHG | HEART RATE: 75 BPM

## 2023-04-24 DIAGNOSIS — R19.4 CHANGE IN BOWEL HABITS: ICD-10-CM

## 2023-04-24 DIAGNOSIS — R10.10 PAIN OF UPPER ABDOMEN: ICD-10-CM

## 2023-04-24 DIAGNOSIS — R14.0 ABDOMINAL BLOATING: ICD-10-CM

## 2023-04-24 LAB
EXT PREGNANCY TEST URINE: NEGATIVE
EXT. CONTROL: NORMAL

## 2023-04-24 RX ORDER — SODIUM CHLORIDE, SODIUM LACTATE, POTASSIUM CHLORIDE, CALCIUM CHLORIDE 600; 310; 30; 20 MG/100ML; MG/100ML; MG/100ML; MG/100ML
125 INJECTION, SOLUTION INTRAVENOUS CONTINUOUS
Status: CANCELLED | OUTPATIENT
Start: 2023-04-24

## 2023-04-24 RX ORDER — SODIUM CHLORIDE, SODIUM LACTATE, POTASSIUM CHLORIDE, CALCIUM CHLORIDE 600; 310; 30; 20 MG/100ML; MG/100ML; MG/100ML; MG/100ML
INJECTION, SOLUTION INTRAVENOUS CONTINUOUS PRN
Status: DISCONTINUED | OUTPATIENT
Start: 2023-04-24 | End: 2023-04-24

## 2023-04-24 RX ORDER — PROPOFOL 10 MG/ML
INJECTION, EMULSION INTRAVENOUS AS NEEDED
Status: DISCONTINUED | OUTPATIENT
Start: 2023-04-24 | End: 2023-04-24

## 2023-04-24 RX ORDER — LIDOCAINE HYDROCHLORIDE 10 MG/ML
INJECTION, SOLUTION EPIDURAL; INFILTRATION; INTRACAUDAL; PERINEURAL AS NEEDED
Status: DISCONTINUED | OUTPATIENT
Start: 2023-04-24 | End: 2023-04-24

## 2023-04-24 RX ADMIN — PROPOFOL 50 MG: 10 INJECTION, EMULSION INTRAVENOUS at 12:00

## 2023-04-24 RX ADMIN — SODIUM CHLORIDE, SODIUM LACTATE, POTASSIUM CHLORIDE, AND CALCIUM CHLORIDE: .6; .31; .03; .02 INJECTION, SOLUTION INTRAVENOUS at 11:56

## 2023-04-24 RX ADMIN — PROPOFOL 20 MG: 10 INJECTION, EMULSION INTRAVENOUS at 12:11

## 2023-04-24 RX ADMIN — LIDOCAINE HYDROCHLORIDE 50 MG: 10 INJECTION, SOLUTION EPIDURAL; INFILTRATION; INTRACAUDAL at 11:59

## 2023-04-24 RX ADMIN — PROPOFOL 20 MG: 10 INJECTION, EMULSION INTRAVENOUS at 12:10

## 2023-04-24 RX ADMIN — PROPOFOL 20 MG: 10 INJECTION, EMULSION INTRAVENOUS at 12:09

## 2023-04-24 RX ADMIN — PROPOFOL 50 MG: 10 INJECTION, EMULSION INTRAVENOUS at 12:05

## 2023-04-24 RX ADMIN — PROPOFOL 50 MG: 10 INJECTION, EMULSION INTRAVENOUS at 12:07

## 2023-04-24 RX ADMIN — SODIUM CHLORIDE, SODIUM LACTATE, POTASSIUM CHLORIDE, AND CALCIUM CHLORIDE: .6; .31; .03; .02 INJECTION, SOLUTION INTRAVENOUS at 11:09

## 2023-04-24 RX ADMIN — PROPOFOL 50 MG: 10 INJECTION, EMULSION INTRAVENOUS at 12:03

## 2023-04-24 RX ADMIN — PROPOFOL 20 MG: 10 INJECTION, EMULSION INTRAVENOUS at 12:08

## 2023-04-24 RX ADMIN — PROPOFOL 200 MG: 10 INJECTION, EMULSION INTRAVENOUS at 11:59

## 2023-04-24 RX ADMIN — PROPOFOL 50 MG: 10 INJECTION, EMULSION INTRAVENOUS at 12:04

## 2023-04-24 NOTE — ANESTHESIA POSTPROCEDURE EVALUATION
Post-Op Assessment Note    CV Status:  Stable  Pain Score: 0    Pain management: adequate     Mental Status:  Alert and awake   Hydration Status:  Euvolemic   PONV Controlled:  Controlled   Airway Patency:  Patent      Post Op Vitals Reviewed: Yes      Staff: CRNA         No notable events documented      BP   93/56   Temp  97   Pulse  71   Resp  16   SpO2   97

## 2023-04-24 NOTE — H&P
History and Physical - [facility]  Randolph Moe 32 y o  female MRN: [id number]                  HPI: Randolph Moe is a 32y o  year old female who presents for abdominal pain  REVIEW OF SYSTEMS: Per the HPI, and otherwise unremarkable  Historical Information   Past Medical History:   Diagnosis Date   • Acute bronchitis with bronchospasm 2018   • Acute non-recurrent pansinusitis 2018   • Allergic 2009    Seasonal, sinus   • Anxiety    • Asthma    • Depression    • GERD (gastroesophageal reflux disease)     Occasional   • Kidney damage     One Kidney has decreased function from previous damage   • Other abnormality of red blood cells    • PMS (premenstrual syndrome) 2021   • Screening for heart disease 2018   • Unilateral congenital vesico-uretero-renal reflux     left     Past Surgical History:   Procedure Laterality Date   • EGD     • RI HYSTEROSCOPY BX ENDOMETRIUM&/POLYPC W/WO D&C N/A 2020    Procedure: HYSTEROSCOPY; BIOPSY (Dina Merrill)  ;  Surgeon: Haley Robertson DO;  [facility];  Service: Gynecology   • RI LAPS FULG/EXC OVARY VISCERA/PERITONEAL SURFACE N/A 2020    Procedure: LAPAROSCOPY; FULGERATION OF ENDOMETRIOSIS, POLYPECTOMY, DRAINAGE OF LEFT OVARIAN CYST;  Surgeon: Haley Robertson DO;  [facility];  Service: Gynecology   • URETERAL REIMPLANTION Bilateral age 1   • [de-identified] TOOTH EXTRACTION       Social History   Social History     Substance and Sexual Activity   Alcohol Use Yes   • Alcohol/week: 5 0 standard drinks   • Types: 5 Cans of beer per week    Comment: Weekends only     Social History     Substance and Sexual Activity   Drug Use No     Social History     Tobacco Use   Smoking Status Former   • Packs/day: 0 00   • Years: 3 00   • Pack years: 0 00   • Types: Cigarettes   • Quit date:    • Years since quittin 3   Smokeless Tobacco Never     Family History   Problem Relation Age of Onset   • "Cancer Maternal Grandfather         pancreatic   • Depression Father    • Crohn's disease Father    • Anxiety disorder Father    • Depression Mother         (undiagnosed)   • Hyperthyroidism Mother         Graves   • Bipolar disorder Maternal Aunt    • Cancer Paternal Grandmother         blood cancer but not leukemia       Meds/Allergies       Current Outpatient Medications:   •  albuterol (ProAir HFA) 90 mcg/act inhaler  •  cetirizine (ZyrTEC) 10 mg tablet  •  fluticasone (FLONASE) 50 mcg/act nasal spray  •  pantoprazole (PROTONIX) 40 mg tablet  •  sertraline (ZOLOFT) 100 mg tablet  •  sertraline (ZOLOFT) 50 mg tablet  •  cyclobenzaprine (FLEXERIL) 5 mg tablet  •  LORazepam (ATIVAN) 0 5 mg tablet  •  sucralfate (CARAFATE) 1 g tablet  No current facility-administered medications for this encounter  Facility-Administered Medications Ordered in Other Encounters:   •  lactated ringers infusion, , Intravenous, Continuous PRN, New Bag at 04/24/23 1109    Allergies   Allergen Reactions   • Bupropion Rash and Chest Pain   • Sulfa Antibiotics Chest Pain and Shortness Of Breath       Objective     /76   Pulse 90   Temp (!) 97 3 °F (36 3 °C) (Temporal)   Resp (!) 24   Ht 4' 11\" (1 499 m)   Wt 58 1 kg (128 lb)   SpO2 98%   BMI 25 85 kg/m²       PHYSICAL EXAM    Gen: NAD  Head: NCAT  CV: RRR  CHEST: Clear  ABD: soft, NT/ND  EXT: no edema      ASSESSMENT/PLAN:  This is a 32y o  year old female here for EGD & colonoscopy, and she is stable and optimized for her procedure          "

## 2023-04-24 NOTE — ANESTHESIA PREPROCEDURE EVALUATION
Procedure:  EGD  COLONOSCOPY    Relevant Problems   NEURO/PSYCH   (+) Chronic tension-type headache, not intractable   (+) Generalized anxiety disorder with panic attacks   (+) Mild episode of recurrent major depressive disorder (HCC)   (+) OCD (obsessive compulsive disorder)      PULMONARY   (+) Mild intermittent asthma      EXERCISE INDUCED ASTHMA    Physical Exam    Airway    Mallampati score: I  TM Distance: >3 FB  Neck ROM: full     Dental   Comment: Denies loose teeth,     Cardiovascular  Cardiovascular exam normal    Pulmonary  Pulmonary exam normal     Other Findings  Portions of exam deferred due to low yield and/or unknown COVID status      Anesthesia Plan  ASA Score- 2     Anesthesia Type- IV sedation with anesthesia with ASA Monitors  Additional Monitors:   Airway Plan:           Plan Factors-Exercise tolerance (METS): >4 METS  Chart reviewed  Existing labs reviewed  Patient summary reviewed  Patient is not a current smoker  Induction- intravenous  Postoperative Plan-     Informed Consent- Anesthetic plan and risks discussed with patient  I personally reviewed this patient with the CRNA  Discussed and agreed on the Anesthesia Plan with the CRNA  Nelda Jones

## 2023-05-05 DIAGNOSIS — F41.0 GENERALIZED ANXIETY DISORDER WITH PANIC ATTACKS: ICD-10-CM

## 2023-05-05 DIAGNOSIS — F41.1 GENERALIZED ANXIETY DISORDER WITH PANIC ATTACKS: ICD-10-CM

## 2023-05-05 DIAGNOSIS — F33.0 MILD EPISODE OF RECURRENT MAJOR DEPRESSIVE DISORDER (HCC): ICD-10-CM

## 2023-06-04 DIAGNOSIS — F33.0 MILD EPISODE OF RECURRENT MAJOR DEPRESSIVE DISORDER (HCC): ICD-10-CM

## 2023-06-04 DIAGNOSIS — F41.0 GENERALIZED ANXIETY DISORDER WITH PANIC ATTACKS: ICD-10-CM

## 2023-06-04 DIAGNOSIS — F41.1 GENERALIZED ANXIETY DISORDER WITH PANIC ATTACKS: ICD-10-CM

## 2023-06-04 RX ORDER — SERTRALINE HYDROCHLORIDE 100 MG/1
TABLET, FILM COATED ORAL
Qty: 30 TABLET | Refills: 2 | Status: SHIPPED | OUTPATIENT
Start: 2023-06-04

## 2023-06-05 ENCOUNTER — OFFICE VISIT (OUTPATIENT)
Dept: GASTROENTEROLOGY | Facility: AMBULARY SURGERY CENTER | Age: 28
End: 2023-06-05
Payer: COMMERCIAL

## 2023-06-05 VITALS
SYSTOLIC BLOOD PRESSURE: 110 MMHG | RESPIRATION RATE: 18 BRPM | HEIGHT: 59 IN | OXYGEN SATURATION: 100 % | HEART RATE: 76 BPM | DIASTOLIC BLOOD PRESSURE: 70 MMHG | WEIGHT: 129 LBS | BODY MASS INDEX: 26 KG/M2

## 2023-06-05 DIAGNOSIS — R10.10 PAIN OF UPPER ABDOMEN: Primary | ICD-10-CM

## 2023-06-05 PROCEDURE — 99214 OFFICE O/P EST MOD 30 MIN: CPT | Performed by: INTERNAL MEDICINE

## 2023-06-05 RX ORDER — PANTOPRAZOLE SODIUM 40 MG/1
40 TABLET, DELAYED RELEASE ORAL
Qty: 180 TABLET | Refills: 3 | Status: SHIPPED | OUTPATIENT
Start: 2023-06-05

## 2023-06-05 RX ORDER — DICYCLOMINE HCL 20 MG
20 TABLET ORAL
Qty: 120 TABLET | Refills: 5 | Status: SHIPPED | OUTPATIENT
Start: 2023-06-05

## 2023-06-05 NOTE — PROGRESS NOTES
[facility] - Outpatient Consultation  Arash Barnett Page 32 y o  female MRN: [id number]  Encounter: [id number]      PCP: Janie Gomez DO  Referring: Janie Gomez DO  [address]  [address] Floor, [address],  [address]      ASSESSMENT AND PLAN:      1  Pain of upper abdomen  concern for symptoms related to functional dyspepsia  EGD, HIDA, and RUQ us are unremarkable  Partial improvement from protonix 40 mg daily + carafate-   Will increase to protonix 40 mg BID, add bentyl  R/o gastroparesis, alternative obstructive etiology with imaging  - NM gastric emptying; Future  - CT abdomen pelvis w contrast; Future  - dicyclomine (BENTYL) 20 mg tablet; Take 1 tablet (20 mg total) by mouth 4 (four) times a day (before meals and at bedtime)  Dispense: 120 tablet; Refill: 5    ______________________________________________________________________    CC:  Chief Complaint   Patient presents with   • Abdominal Pain   • GERD     Pantoprazole carafate not helping       HPI:      Patient is a 26-year-old female who sees me in follow-up for abdominal pain  Continues to have upper abdominal pain, which is intermittent and constant in nature  Symptoms are a 7 out of 10 in severity at times  This is continued association with globus sensation  She has had approximately 50% relief with Protonix 40 mg and Carafate throughout the day  This is decreased the frequency of her symptoms, and mild improvement in severity she continues to have symptoms  She had therapeutic failure of omeprazole 20 mg, Pepcid, Protonix 40 mg, Carafate  Upper quadrant ultrasound, HIDA scan, EGD have been unremarkable  She has bowel movements daily with complete evacuation  REVIEW OF SYSTEMS:    CONSTITUTIONAL: Denies any fever, chills, rigors, and weight loss  HEENT: No earache or tinnitus  Denies hearing loss or visual disturbances  CARDIOVASCULAR: No chest pain or palpitations     RESPIRATORY: Denies any cough, hemoptysis, shortness of breath or dyspnea on exertion  GASTROINTESTINAL: As noted in the History of Present Illness  GENITOURINARY: No problems with urination  Denies any hematuria or dysuria  NEUROLOGIC: No dizziness or vertigo, denies headaches  MUSCULOSKELETAL: Denies any muscle or joint pain  SKIN: Denies skin rashes or itching  ENDOCRINE: Denies excessive thirst  Denies intolerance to heat or cold  PSYCHOSOCIAL: Denies depression or anxiety  Denies any recent memory loss  Historical Information   Past Medical History:   Diagnosis Date   • Acute bronchitis with bronchospasm 2/6/2018   • Acute non-recurrent pansinusitis 2/6/2018   • Allergic 2009    Seasonal, sinus   • Anxiety 2010   • Asthma 2006   • Depression 2008   • GERD (gastroesophageal reflux disease) 2021    Occasional   • Kidney damage     One Kidney has decreased function from previous damage   • Other abnormality of red blood cells    • PMS (premenstrual syndrome) 6/9/2021   • Screening for heart disease 2/6/2018   • Unilateral congenital vesico-uretero-renal reflux     left     Past Surgical History:   Procedure Laterality Date   • EGD     • WA HYSTEROSCOPY BX ENDOMETRIUM&/POLYPC W/WO D&C N/A 6/22/2020    Procedure: HYSTEROSCOPY; BIOPSY (Clearnce Marshal)  ;  Surgeon: Arnaud Amor DO;  Location: [facility];  Service: Gynecology   • WA LAPS FULG/EXC OVARY VISCERA/PERITONEAL SURFACE N/A 6/22/2020    Procedure: LAPAROSCOPY; FULGERATION OF ENDOMETRIOSIS, POLYPECTOMY, DRAINAGE OF LEFT OVARIAN CYST;  Surgeon: Arnaud Amor DO;  Location: [facility];  Service: Gynecology   • URETERAL REIMPLANTION Bilateral age 1   • WISDOM TOOTH EXTRACTION       Social History   Social History     Substance and Sexual Activity   Alcohol Use Yes   • Alcohol/week: 5 0 standard drinks of alcohol   • Types: 5 Cans of beer per week    Comment: Weekends only     Social History     Substance and Sexual Activity   Drug Use No     Social History "    Tobacco Use   Smoking Status Former   • Packs/day: 0 00   • Years: 3 00   • Total pack years: 0 00   • Types: Cigarettes   • Quit date:    • Years since quittin 4   Smokeless Tobacco Never     Family History   Problem Relation Age of Onset   • Cancer Maternal Grandfather         pancreatic   • Depression Father    • Crohn's disease Father    • Anxiety disorder Father    • Depression Mother         (undiagnosed)   • Hyperthyroidism Mother         Graves   • Bipolar disorder Maternal Aunt    • Cancer Paternal Grandmother         blood cancer but not leukemia       Meds/Allergies       Current Outpatient Medications:   •  albuterol (ProAir HFA) 90 mcg/act inhaler  •  cetirizine (ZyrTEC) 10 mg tablet  •  fluticasone (FLONASE) 50 mcg/act nasal spray  •  LORazepam (ATIVAN) 0 5 mg tablet  •  pantoprazole (PROTONIX) 40 mg tablet  •  sertraline (ZOLOFT) 100 mg tablet  •  sertraline (ZOLOFT) 50 mg tablet  •  sucralfate (CARAFATE) 1 g tablet  •  cyclobenzaprine (FLEXERIL) 5 mg tablet    Allergies   Allergen Reactions   • Bupropion Rash and Chest Pain   • Sulfa Antibiotics Chest Pain and Shortness Of Breath           Objective     Blood pressure 110/70, pulse 76, resp  rate 18, height 4' 11\" (1 499 m), weight 58 5 kg (129 lb), SpO2 100 %  Body mass index is 26 05 kg/m²  PHYSICAL EXAM:      General Appearance:   Alert, cooperative, no distress   HEENT:   Normocephalic, atraumatic, anicteric  Neck:  Supple, symmetrical, trachea midline   Lungs:   Clear to auscultation bilaterally; no rales, rhonchi or wheezing; respirations unlabored    Heart[de-identified]   Regular rate and rhythm; no murmur, rub, or gallop     Abdomen:   Soft, non-tender, non-distended; normal bowel sounds; no masses, no organomegaly    Genitalia:   Deferred    Rectal:   Deferred    Extremities:  No cyanosis, clubbing or edema    Pulses:  2+ and symmetric    Skin:  No jaundice, rashes, or lesions    Lymph nodes:  No palpable cervical lymphadenopathy    " "    Lab Results:     No results found for: \"HCT\", \"HGB\", \"MCV\", \"PLT\", \"WBC\"    Lab Results   Component Value Date    BUN 14 03/13/2023    CALCIUM 9 3 03/13/2023     03/13/2023    CO2 30 03/13/2023    CREATININE 0 94 03/13/2023    EGFR 85 03/13/2023    K 4 1 03/13/2023       No results found for: \"INR\", \"PROTIME\"      Radiology Results:   No results found  Portions of the record may have been created with voice recognition software  Occasional wrong word or \"sound a like\" substitutions may have occurred due to the inherent limitations of voice recognition software  Read the chart carefully and recognize, using context, where substitutions have occurred          "

## 2023-06-09 ENCOUNTER — OFFICE VISIT (OUTPATIENT)
Dept: INTERNAL MEDICINE CLINIC | Facility: CLINIC | Age: 28
End: 2023-06-09
Payer: COMMERCIAL

## 2023-06-09 VITALS
SYSTOLIC BLOOD PRESSURE: 122 MMHG | TEMPERATURE: 98.2 F | OXYGEN SATURATION: 99 % | WEIGHT: 130.6 LBS | HEART RATE: 98 BPM | DIASTOLIC BLOOD PRESSURE: 98 MMHG | BODY MASS INDEX: 26.38 KG/M2

## 2023-06-09 DIAGNOSIS — J45.20 MILD INTERMITTENT ASTHMA WITHOUT COMPLICATION: ICD-10-CM

## 2023-06-09 DIAGNOSIS — Z83.49 FAMILY HISTORY OF THYROID DISEASE: ICD-10-CM

## 2023-06-09 DIAGNOSIS — K21.9 NONEROSIVE GASTROESOPHAGEAL REFLUX DISEASE: Primary | ICD-10-CM

## 2023-06-09 DIAGNOSIS — J30.1 SEASONAL ALLERGIC RHINITIS DUE TO POLLEN: ICD-10-CM

## 2023-06-09 DIAGNOSIS — E16.2 HYPOGLYCEMIA, UNSPECIFIED: ICD-10-CM

## 2023-06-09 PROCEDURE — 99214 OFFICE O/P EST MOD 30 MIN: CPT | Performed by: INTERNAL MEDICINE

## 2023-06-09 NOTE — PROGRESS NOTES
Assessment/Plan:    Problem List Items Addressed This Visit        Digestive    Nonerosive gastroesophageal reflux disease - Primary     -s/p EGD 4/24  -persists despite protonix, carafate and recently switched to bentyl  -follow up GI         Relevant Orders    CBC       Respiratory    Mild intermittent asthma     -stable  -triggered by anxiety  -use proair prn         Relevant Orders    CBC    Seasonal allergic rhinitis due to pollen     -continue flonase and zyrtec        Other Visit Diagnoses     Hypoglycemia, unspecified        -clinical  -obtain fasting glucose  -recommend low carb diet, increase protein intake    Relevant Orders    TSH, 3rd generation with Free T4 reflex    Glucose, fasting    CBC    Family history of thyroid disease        -recheck TSH          Subjective:      Patient ID: Britany Moe is a 32 y o  female  HPI      Has FH of thyroid disorder  She has been having pain in her throat that originates from her stomach  Similar to acid reflux and had recent endoscopy that was negative  HIDA scan also negative  Also reports low blood sugars and get shaky and dizzy  Follows a regular diet  Reports losing her hair a bit  She has been on protonix for a while and has helped some  She has continued to take flonase for PND and zyrtec  Has occasional trouble swallowing      The following portions of the patient's history were reviewed and updated as appropriate: allergies, current medications, past family history, past medical history, past social history, past surgical history and problem list       Current Outpatient Medications:   •  albuterol (ProAir HFA) 90 mcg/act inhaler, Inhale 2 puffs every 4 (four) hours as needed for wheezing or shortness of breath, Disp: 8 5 g, Rfl: 2  •  cetirizine (ZyrTEC) 10 mg tablet, Take 10 mg by mouth daily in the early morning, Disp: , Rfl:   •  dicyclomine (BENTYL) 20 mg tablet, Take 1 tablet (20 mg total) by mouth 4 (four) times a day (before meals and at bedtime), Disp: 120 tablet, Rfl: 5  •  fluticasone (FLONASE) 50 mcg/act nasal spray, 1 spray into each nostril daily, Disp: 16 g, Rfl: 2  •  LORazepam (ATIVAN) 0 5 mg tablet, Take 1 tablet (0 5 mg total) by mouth daily as needed for anxiety, Disp: 30 tablet, Rfl: 0  •  pantoprazole (PROTONIX) 40 mg tablet, Take 1 tablet (40 mg total) by mouth 2 (two) times a day before meals, Disp: 180 tablet, Rfl: 3  •  sertraline (ZOLOFT) 100 mg tablet, TAKE ONE TABLET BY MOUTH EVERY DAY, Disp: 30 tablet, Rfl: 2  •  sertraline (ZOLOFT) 50 mg tablet, TAKE ONE TABLET BY MOUTH EVERY DAY WITH 100 MG TABLET FOR TOTAL  MG DAILY, Disp: 30 tablet, Rfl: 0  •  cyclobenzaprine (FLEXERIL) 5 mg tablet, Take 1 tablet (5 mg total) by mouth daily as needed for muscle spasms (Patient not taking: Reported on 9/16/2022), Disp: 14 tablet, Rfl: 0  •  sucralfate (CARAFATE) 1 g tablet, TAKE ONE TABLET BY MOUTH FOUR TIMES A DAY 1 HOUR BEFORE MEALS AND AT BEDTIME, Disp: , Rfl:     Review of Systems   HENT: Positive for postnasal drip, sore throat and trouble swallowing  Respiratory: Negative for cough, shortness of breath and wheezing  Cardiovascular: Negative for chest pain, palpitations and leg swelling  Gastrointestinal:        Gastritis   Endocrine:        Hair loss         Objective:    /98 (BP Location: Left arm, Patient Position: Sitting, Cuff Size: Standard)   Pulse 98   Temp 98 2 °F (36 8 °C)   Wt 59 2 kg (130 lb 9 6 oz)   SpO2 99%   BMI 26 38 kg/m²      Physical Exam  Vitals reviewed  HENT:      Nose: Nose normal       Mouth/Throat:      Mouth: Mucous membranes are moist       Pharynx: Oropharynx is clear  Neck:      Thyroid: No thyromegaly or thyroid tenderness  Cardiovascular:      Rate and Rhythm: Normal rate and regular rhythm  Pulses: Normal pulses  Heart sounds: Normal heart sounds  Pulmonary:      Effort: Pulmonary effort is normal  No respiratory distress        Breath sounds: Normal breath sounds  No wheezing  Abdominal:      General: Bowel sounds are normal  There is no distension  Palpations: Abdomen is soft  Tenderness: There is no abdominal tenderness  Musculoskeletal:      Cervical back: Neck supple  Right lower leg: No edema  Left lower leg: No edema  Lymphadenopathy:      Cervical: No cervical adenopathy  Neurological:      Mental Status: She is alert and oriented to person, place, and time     Psychiatric:         Mood and Affect: Mood normal

## 2023-07-11 DIAGNOSIS — F41.0 GENERALIZED ANXIETY DISORDER WITH PANIC ATTACKS: ICD-10-CM

## 2023-07-11 DIAGNOSIS — F41.1 GENERALIZED ANXIETY DISORDER WITH PANIC ATTACKS: ICD-10-CM

## 2023-07-11 DIAGNOSIS — F33.0 MILD EPISODE OF RECURRENT MAJOR DEPRESSIVE DISORDER (HCC): ICD-10-CM

## 2023-07-11 LAB
BASOPHILS # BLD AUTO: 49 CELLS/UL (ref 0–200)
BASOPHILS NFR BLD AUTO: 0.9 %
EOSINOPHIL # BLD AUTO: 59 CELLS/UL (ref 15–500)
EOSINOPHIL NFR BLD AUTO: 1.1 %
ERYTHROCYTE [DISTWIDTH] IN BLOOD BY AUTOMATED COUNT: 11.9 % (ref 11–15)
GLUCOSE SERPL-MCNC: 93 MG/DL (ref 65–99)
HCT VFR BLD AUTO: 41.1 % (ref 35–45)
HGB BLD-MCNC: 13.6 G/DL (ref 11.7–15.5)
LYMPHOCYTES # BLD AUTO: 1879 CELLS/UL (ref 850–3900)
LYMPHOCYTES NFR BLD AUTO: 34.8 %
MCH RBC QN AUTO: 28.8 PG (ref 27–33)
MCHC RBC AUTO-ENTMCNC: 33.1 G/DL (ref 32–36)
MCV RBC AUTO: 87.1 FL (ref 80–100)
MONOCYTES # BLD AUTO: 475 CELLS/UL (ref 200–950)
MONOCYTES NFR BLD AUTO: 8.8 %
NEUTROPHILS # BLD AUTO: 2938 CELLS/UL (ref 1500–7800)
NEUTROPHILS NFR BLD AUTO: 54.4 %
PLATELET # BLD AUTO: 195 THOUSAND/UL (ref 140–400)
PMV BLD REES-ECKER: 10.4 FL (ref 7.5–12.5)
RBC # BLD AUTO: 4.72 MILLION/UL (ref 3.8–5.1)
TSH SERPL-ACNC: 1.34 MIU/L
WBC # BLD AUTO: 5.4 THOUSAND/UL (ref 3.8–10.8)

## 2023-07-17 RX ORDER — SERTRALINE HYDROCHLORIDE 100 MG/1
100 TABLET, FILM COATED ORAL DAILY
Qty: 90 TABLET | Refills: 1 | Status: SHIPPED | OUTPATIENT
Start: 2023-07-17

## 2024-01-04 ENCOUNTER — OFFICE VISIT (OUTPATIENT)
Dept: INTERNAL MEDICINE CLINIC | Facility: CLINIC | Age: 29
End: 2024-01-04
Payer: COMMERCIAL

## 2024-01-04 VITALS
DIASTOLIC BLOOD PRESSURE: 72 MMHG | SYSTOLIC BLOOD PRESSURE: 98 MMHG | HEART RATE: 93 BPM | OXYGEN SATURATION: 98 % | HEIGHT: 59 IN | RESPIRATION RATE: 16 BRPM | TEMPERATURE: 97.7 F | WEIGHT: 133 LBS | BODY MASS INDEX: 26.81 KG/M2

## 2024-01-04 DIAGNOSIS — R41.840 POOR CONCENTRATION: ICD-10-CM

## 2024-01-04 DIAGNOSIS — R53.83 OTHER FATIGUE: Primary | ICD-10-CM

## 2024-01-04 DIAGNOSIS — F41.1 GENERALIZED ANXIETY DISORDER WITH PANIC ATTACKS: ICD-10-CM

## 2024-01-04 DIAGNOSIS — F41.0 GENERALIZED ANXIETY DISORDER WITH PANIC ATTACKS: ICD-10-CM

## 2024-01-04 DIAGNOSIS — F33.0 MILD EPISODE OF RECURRENT MAJOR DEPRESSIVE DISORDER (HCC): ICD-10-CM

## 2024-01-04 PROCEDURE — 99213 OFFICE O/P EST LOW 20 MIN: CPT | Performed by: INTERNAL MEDICINE

## 2024-01-04 NOTE — ASSESSMENT & PLAN NOTE
-with OCD  -mild in severity but is experiencing fatigue and decreased concentration  -will refer to psychiatry for med management.  Pt to remain on sertraline 150mg daily  -continue CBT

## 2024-01-04 NOTE — PROGRESS NOTES
Assessment/Plan:    Problem List Items Addressed This Visit     Mild episode of recurrent major depressive disorder (HCC)    Relevant Orders    Vitamin D 25 hydroxy    Comprehensive metabolic panel    Ambulatory referral to Psych Services    Generalized anxiety disorder with panic attacks     -with OCD  -mild in severity but is experiencing fatigue and decreased concentration  -will refer to psychiatry for med management.  Pt to remain on sertraline 150mg daily  -continue CBT         Relevant Orders    CBC    TSH, 3rd generation with Free T4 reflex    Comprehensive metabolic panel    Ambulatory referral to Psych Services   Other Visit Diagnoses     Other fatigue    -  Primary    -r/o metabolic cause, if neg may be due to anxiety/depression    Relevant Orders    CBC    TSH, 3rd generation with Free T4 reflex    Vitamin D 25 hydroxy    Comprehensive metabolic panel    Poor concentration        -therapist concerned about ADHD, refer for testing    Relevant Orders    Ambulatory referral to Psych Services            Subjective:      Patient ID: Brianna Moe is a 28 y.o. female.    HPI    She has trena feeling fatigued and tired, so much more than normal. Her therapist recommend evaluation and possible evaluation for ADHD.  Concerned about executive dysfunction issues, not able to do normal tasks, sometimes she can't complete tasks which is not new.  Occurring for one month, she has been taking naps.  Overnight, she gets 6-8hrs overnight.  She has endometriosis, painful periods are starting to return.  Periods are monthly and lasts for 1.5 weeks.    PHQ-2/9 Depression Screening    Little interest or pleasure in doing things: 1 - several days  Feeling down, depressed, or hopeless: 0 - not at all  Trouble falling or staying asleep, or sleeping too much: 1 - several days  Feeling tired or having little energy: 2 - more than half the days  Poor appetite or overeatin - not at all  Feeling bad about yourself - or that  you are a failure or have let yourself or your family down: 0 - not at all  Trouble concentrating on things, such as reading the newspaper or watching television: 0 - not at all  Moving or speaking so slowly that other people could have noticed. Or the opposite - being so fidgety or restless that you have been moving around a lot more than usual: 0 - not at all  Thoughts that you would be better off dead, or of hurting yourself in some way: 0 - not at all  PHQ-9 Score: 4  PHQ-9 Interpretation: No or Minimal depression       JOSEF-7 Flowsheet Screening    Flowsheet Row Most Recent Value   Over the last 2 weeks, how often have you been bothered by any of the following problems?    Feeling nervous, anxious, or on edge 1   Not being able to stop or control worrying 1   Worrying too much about different things 1   Trouble relaxing 3   Being so restless that it is hard to sit still 0   Becoming easily annoyed or irritable 0   Feeling afraid as if something awful might happen 1   JOSEF-7 Total Score 7             The following portions of the patient's history were reviewed and updated as appropriate: allergies, current medications, past family history, past medical history, past social history, past surgical history and problem list.      Current Outpatient Medications:   •  albuterol (ProAir HFA) 90 mcg/act inhaler, Inhale 2 puffs every 4 (four) hours as needed for wheezing or shortness of breath, Disp: 8.5 g, Rfl: 2  •  cetirizine (ZyrTEC) 10 mg tablet, Take 10 mg by mouth daily in the early morning, Disp: , Rfl:   •  dicyclomine (BENTYL) 20 mg tablet, Take 1 tablet (20 mg total) by mouth 4 (four) times a day (before meals and at bedtime), Disp: 120 tablet, Rfl: 5  •  fluticasone (FLONASE) 50 mcg/act nasal spray, 1 spray into each nostril daily, Disp: 16 g, Rfl: 2  •  LORazepam (ATIVAN) 0.5 mg tablet, Take 1 tablet (0.5 mg total) by mouth daily as needed for anxiety, Disp: 30 tablet, Rfl: 0  •  pantoprazole (PROTONIX) 40 mg  "tablet, Take 1 tablet (40 mg total) by mouth 2 (two) times a day before meals, Disp: 180 tablet, Rfl: 3  •  sertraline (ZOLOFT) 100 mg tablet, Take 1 tablet (100 mg total) by mouth daily, Disp: 90 tablet, Rfl: 1  •  sertraline (ZOLOFT) 50 mg tablet, Take 1 tablet (50 mg total) by mouth daily, Disp: 90 tablet, Rfl: 1    Review of Systems   Constitutional:  Positive for fatigue. Negative for appetite change and unexpected weight change.   Respiratory:  Negative for shortness of breath.    Cardiovascular:  Negative for palpitations.   Gastrointestinal:  Negative for abdominal pain, constipation, diarrhea, nausea and vomiting.   Musculoskeletal:  Negative for joint swelling.   Skin:  Negative for rash.   Neurological:  Positive for headaches. Negative for dizziness.   Psychiatric/Behavioral:  Positive for sleep disturbance.          Objective:    BP 98/72 (BP Location: Left arm, Patient Position: Sitting, Cuff Size: Standard)   Pulse 93   Temp 97.7 °F (36.5 °C) (Tympanic Core)   Resp 16   Ht 4' 11\" (1.499 m)   Wt 60.3 kg (133 lb)   SpO2 98%   BMI 26.86 kg/m²      Physical Exam  Vitals reviewed.   Constitutional:       General: She is not in acute distress.  HENT:      Mouth/Throat:      Mouth: Mucous membranes are moist.   Eyes:      Conjunctiva/sclera: Conjunctivae normal.   Cardiovascular:      Rate and Rhythm: Normal rate and regular rhythm.      Pulses: Normal pulses.      Heart sounds: Normal heart sounds.   Pulmonary:      Effort: Pulmonary effort is normal. No respiratory distress.      Breath sounds: Normal breath sounds. No wheezing.   Musculoskeletal:      Cervical back: Neck supple. No tenderness.   Lymphadenopathy:      Cervical: No cervical adenopathy.   Skin:     Coloration: Skin is not pale.   Neurological:      Mental Status: She is alert and oriented to person, place, and time.      Motor: No weakness.   Psychiatric:         Attention and Perception: Attention normal.         Mood and Affect: " Mood is depressed.         Speech: Speech normal.         Behavior: Behavior is cooperative.

## 2024-01-10 DIAGNOSIS — F41.0 GENERALIZED ANXIETY DISORDER WITH PANIC ATTACKS: ICD-10-CM

## 2024-01-10 DIAGNOSIS — F41.1 GENERALIZED ANXIETY DISORDER WITH PANIC ATTACKS: ICD-10-CM

## 2024-01-10 DIAGNOSIS — F33.0 MILD EPISODE OF RECURRENT MAJOR DEPRESSIVE DISORDER (HCC): ICD-10-CM

## 2024-01-10 RX ORDER — SERTRALINE HYDROCHLORIDE 100 MG/1
100 TABLET, FILM COATED ORAL DAILY
Qty: 90 TABLET | Refills: 1 | Status: SHIPPED | OUTPATIENT
Start: 2024-01-10

## 2024-01-29 LAB
25(OH)D3 SERPL-MCNC: 28 NG/ML (ref 30–100)
ALBUMIN SERPL-MCNC: 4.2 G/DL (ref 3.6–5.1)
ALBUMIN/GLOB SERPL: 1.6 (CALC) (ref 1–2.5)
ALP SERPL-CCNC: 53 U/L (ref 31–125)
ALT SERPL-CCNC: 11 U/L (ref 6–29)
AST SERPL-CCNC: 15 U/L (ref 10–30)
BILIRUB SERPL-MCNC: 0.5 MG/DL (ref 0.2–1.2)
BUN SERPL-MCNC: 19 MG/DL (ref 7–25)
BUN/CREAT SERPL: NORMAL (CALC) (ref 6–22)
CALCIUM SERPL-MCNC: 9.2 MG/DL (ref 8.6–10.2)
CHLORIDE SERPL-SCNC: 105 MMOL/L (ref 98–110)
CO2 SERPL-SCNC: 28 MMOL/L (ref 20–32)
CREAT SERPL-MCNC: 0.93 MG/DL (ref 0.5–0.96)
ERYTHROCYTE [DISTWIDTH] IN BLOOD BY AUTOMATED COUNT: 12 % (ref 11–15)
GFR/BSA.PRED SERPLBLD CYS-BASED-ARV: 86 ML/MIN/1.73M2
GLOBULIN SER CALC-MCNC: 2.7 G/DL (CALC) (ref 1.9–3.7)
GLUCOSE SERPL-MCNC: 85 MG/DL (ref 65–99)
HCT VFR BLD AUTO: 39.9 % (ref 35–45)
HGB BLD-MCNC: 12.9 G/DL (ref 11.7–15.5)
MCH RBC QN AUTO: 27.9 PG (ref 27–33)
MCHC RBC AUTO-ENTMCNC: 32.3 G/DL (ref 32–36)
MCV RBC AUTO: 86.2 FL (ref 80–100)
PLATELET # BLD AUTO: 223 THOUSAND/UL (ref 140–400)
PMV BLD REES-ECKER: 9.9 FL (ref 7.5–12.5)
POTASSIUM SERPL-SCNC: 4.1 MMOL/L (ref 3.5–5.3)
PROT SERPL-MCNC: 6.9 G/DL (ref 6.1–8.1)
RBC # BLD AUTO: 4.63 MILLION/UL (ref 3.8–5.1)
SODIUM SERPL-SCNC: 139 MMOL/L (ref 135–146)
TSH SERPL-ACNC: 1.36 MIU/L
WBC # BLD AUTO: 4.5 THOUSAND/UL (ref 3.8–10.8)

## 2024-07-12 DIAGNOSIS — F33.0 MILD EPISODE OF RECURRENT MAJOR DEPRESSIVE DISORDER (HCC): ICD-10-CM

## 2024-07-12 DIAGNOSIS — F41.1 GENERALIZED ANXIETY DISORDER WITH PANIC ATTACKS: ICD-10-CM

## 2024-07-12 DIAGNOSIS — F41.0 GENERALIZED ANXIETY DISORDER WITH PANIC ATTACKS: ICD-10-CM

## 2024-07-12 RX ORDER — SERTRALINE HYDROCHLORIDE 100 MG/1
100 TABLET, FILM COATED ORAL DAILY
Qty: 100 TABLET | Refills: 1 | Status: SHIPPED | OUTPATIENT
Start: 2024-07-12

## 2024-10-10 DIAGNOSIS — F41.1 GENERALIZED ANXIETY DISORDER WITH PANIC ATTACKS: ICD-10-CM

## 2024-10-10 DIAGNOSIS — F41.0 GENERALIZED ANXIETY DISORDER WITH PANIC ATTACKS: ICD-10-CM

## 2024-10-10 DIAGNOSIS — F33.0 MILD EPISODE OF RECURRENT MAJOR DEPRESSIVE DISORDER (HCC): ICD-10-CM

## 2024-11-21 ENCOUNTER — TELEPHONE (OUTPATIENT)
Age: 29
End: 2024-11-21

## 2025-01-04 DIAGNOSIS — F33.0 MILD EPISODE OF RECURRENT MAJOR DEPRESSIVE DISORDER (HCC): ICD-10-CM

## 2025-01-04 DIAGNOSIS — F41.0 GENERALIZED ANXIETY DISORDER WITH PANIC ATTACKS: ICD-10-CM

## 2025-01-04 DIAGNOSIS — F41.1 GENERALIZED ANXIETY DISORDER WITH PANIC ATTACKS: ICD-10-CM

## 2025-01-06 RX ORDER — SERTRALINE HYDROCHLORIDE 100 MG/1
100 TABLET, FILM COATED ORAL DAILY
Qty: 30 TABLET | Refills: 0 | Status: SHIPPED | OUTPATIENT
Start: 2025-01-06

## 2025-01-24 ENCOUNTER — TELEPHONE (OUTPATIENT)
Age: 30
End: 2025-01-24

## 2025-01-24 NOTE — TELEPHONE ENCOUNTER
"Behavioral Health Outpatient Intake Questions    Referred By   : Therapist    Please advise interviewee that they need to answer all questions truthfully to allow for best care, and any misrepresentations of information may affect their ability to be seen at this clinic   => Was this discussed? Yes     Behavioral Health Outpatient Intake History -     Presenting Problem (in patient's own words): Therapist suspects ADHD and patient is happening trouble with executive function    Are there any communication barriers for this patient?     No                                                 Are you taking any psychiatric medications? Yes     If \"YES\" -What are they Zoloft     If \"YES\" -Who prescribes? PCP    Has the Patient previously received outpatient Talk Therapy or Medication Management from [facility]  No     Has the Patient abused alcohol or other substances in the last 6 months ? No  No concerns of substance abuse are reported.    Legal History-     Is this treatment court ordered? No     Has the Patient been convicted of a felony?  No    ACCEPTED as a patient Yes  If \"Yes\" Appointment Date: 02/24/25 NP VIRTUAL visit @ 3pm    Referred Elsewhere? No    Name of Insurance Co:[employer]  Insurance ID#[id number]  Insurance Phone #  If ins is primary or secondary?Primary  If patient is a minor, parents information such as Name, D.O.B of guarantor.  "

## 2025-01-24 NOTE — TELEPHONE ENCOUNTER
Contacted patient off of Medication Management  to verify needs of services in attempts to offer patient an appointment. LVM for patient to contact intake dept  in regards to offer possible virtual appointment at [phone] opt 3. 1st attempt.

## 2025-01-30 ENCOUNTER — TELEPHONE (OUTPATIENT)
Dept: PSYCHIATRY | Facility: CLINIC | Age: 30
End: 2025-01-30

## 2025-01-30 NOTE — TELEPHONE ENCOUNTER
One week follow up call for New Patient appointment with   TERESITA Bernal   on 02/24/2025 was made on 01/30/2025. Writer informed patient of New Patient paperwork needing to be completed 5 days prior to the appointment. Writer confirmed paperwork has been sent via [employer].    Appointment was made on: 01/24/2025

## 2025-02-09 DIAGNOSIS — F33.0 MILD EPISODE OF RECURRENT MAJOR DEPRESSIVE DISORDER (HCC): ICD-10-CM

## 2025-02-09 DIAGNOSIS — F41.1 GENERALIZED ANXIETY DISORDER WITH PANIC ATTACKS: ICD-10-CM

## 2025-02-09 DIAGNOSIS — F41.0 GENERALIZED ANXIETY DISORDER WITH PANIC ATTACKS: ICD-10-CM

## 2025-02-10 ENCOUNTER — TELEPHONE (OUTPATIENT)
Dept: PSYCHIATRY | Facility: CLINIC | Age: 30
End: 2025-02-10

## 2025-02-10 RX ORDER — SERTRALINE HYDROCHLORIDE 100 MG/1
100 TABLET, FILM COATED ORAL DAILY
Qty: 30 TABLET | Refills: 0 | OUTPATIENT
Start: 2025-02-10

## 2025-02-10 NOTE — TELEPHONE ENCOUNTER
Writer left voicemail for client detailing where to find her paperwork for her new pt appointment in 2 weeks with Hien Espinosa.

## 2025-02-14 PROBLEM — G47.00 INSOMNIA: Status: RESOLVED | Noted: 2021-03-02 | Resolved: 2025-02-14

## 2025-02-14 PROBLEM — R00.0 SINUS TACHYCARDIA: Status: RESOLVED | Noted: 2022-02-08 | Resolved: 2025-02-14

## 2025-02-14 PROBLEM — G44.229 CHRONIC TENSION-TYPE HEADACHE, NOT INTRACTABLE: Status: RESOLVED | Noted: 2022-03-23 | Resolved: 2025-02-14

## 2025-02-14 NOTE — BH TREATMENT PLAN
"TREATMENT PLAN (Medication Management Only)        [facility]    Name and Date of Birth:  Brianna Graf Page 29 y.o. 1995  Date of Treatment Plan: February 24, 2025  Diagnosis/Diagnoses: JOSEF and OCD  Strengths/Personal Resources for Self-Care: taking medications as prescribed, ability to adapt to life changes, ability to communicate needs, ability to communicate well, ability to listen, ability to reason, ability to understand psychiatric illness, average or above intelligence.  Area/Areas of need (in own words): \"Executive dysfunction\"  1. Long Term Goal:  Improve executive dysfunction .  Target Date: 8/24/2025  Person/Persons responsible for completion of goal: Brianna  2.  Short Term Objective (s) - How will we reach this goal?:   A. Provider new recommended medication/dosage changes and/or continue medication(s): continue current medications as prescribed.  B.  Maintain medication compliance .  C.  Attend follow-up appointments as scheduled .  Target Date: 8/24/2025  Person/Persons Responsible for Completion of Goal: Brianna  Progress Towards Goals: continuing treatment  Treatment Modality: medication management every 6 weeks  Review due 180 days from date of this plan:  8/24/2025  Expected length of service: ongoing treatment  My Physician/PA/NP and I have developed this plan together and I agree to work on the goals and objectives. I understand the treatment goals that were developed for my treatment.      "
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
## 2025-02-20 ENCOUNTER — TELEPHONE (OUTPATIENT)
Dept: PSYCHIATRY | Facility: CLINIC | Age: 30
End: 2025-02-20

## 2025-02-20 NOTE — TELEPHONE ENCOUNTER
Writer called and left voicemail informing client that most of her forms did come through; however, we are still looking for her [facility] New Patient Packet. Client was also resent the Virtual Care Consent form to amend the emergency contact section.

## 2025-02-24 ENCOUNTER — TELEPHONE (OUTPATIENT)
Dept: PSYCHIATRY | Facility: CLINIC | Age: 30
End: 2025-02-24

## 2025-02-24 ENCOUNTER — TELEMEDICINE (OUTPATIENT)
Dept: PSYCHIATRY | Facility: CLINIC | Age: 30
End: 2025-02-24
Payer: COMMERCIAL

## 2025-02-24 DIAGNOSIS — F42.2 MIXED OBSESSIONAL THOUGHTS AND ACTS: ICD-10-CM

## 2025-02-24 DIAGNOSIS — F41.0 GENERALIZED ANXIETY DISORDER WITH PANIC ATTACKS: ICD-10-CM

## 2025-02-24 DIAGNOSIS — F33.0 MILD EPISODE OF RECURRENT MAJOR DEPRESSIVE DISORDER (HCC): Primary | ICD-10-CM

## 2025-02-24 DIAGNOSIS — F41.1 GENERALIZED ANXIETY DISORDER WITH PANIC ATTACKS: ICD-10-CM

## 2025-02-24 PROBLEM — N64.4 PAIN OF RIGHT BREAST: Status: ACTIVE | Noted: 2025-02-24

## 2025-02-24 PROBLEM — R41.844 EXECUTIVE FUNCTION DEFICIT: Status: RESOLVED | Noted: 2025-02-24 | Resolved: 2025-02-24

## 2025-02-24 PROBLEM — N80.9 ENDOMETRIOSIS: Status: ACTIVE | Noted: 2025-02-24

## 2025-02-24 PROBLEM — R41.844 EXECUTIVE FUNCTION DEFICIT: Status: ACTIVE | Noted: 2025-02-24

## 2025-02-24 PROCEDURE — 90792 PSYCH DIAG EVAL W/MED SRVCS: CPT

## 2025-02-24 RX ORDER — LORAZEPAM 0.5 MG/1
0.5 TABLET ORAL DAILY PRN
Qty: 15 TABLET | Refills: 0 | Status: SHIPPED | OUTPATIENT
Start: 2025-02-24

## 2025-02-24 RX ORDER — SERTRALINE HYDROCHLORIDE 100 MG/1
200 TABLET, FILM COATED ORAL DAILY
Qty: 60 TABLET | Refills: 1 | Status: SHIPPED | OUTPATIENT
Start: 2025-02-24

## 2025-02-24 NOTE — BH CRISIS PLAN
Client Name: Brianna Moe       Client YOB: 1995    Dorothy Safety Plan      Creation Date: 2/24/25    Created By: TERESITA Bernal       Step 1: Warning Signs:   Warning Signs   Isolative in bed   SIB            Step 2: Internal Coping Strategies:   Internal Coping Strategies   Play video games            Step 3: People and social settings that provide distraction:   Name Contact Information    in cell phone   Friend in cell phone            Step 4: People whom I can ask for help during a crisis:      Name Contact Information     in cell phone    mom in cell phone      Step 5: Professionals or agencies I can contact during a crisis:      Clinican/Agency Name Phone Emergency Contact    [facility] [phone]       Local Emergency Department Emergency Department Phone Emergency Department Address    911          Crisis Phone Numbers:   Suicide Prevention Lifeline: Call or Text  901 Crisis Text Line: Text HOME to 347-452   Please note: Some [facility] do not have a separate number for Child/Adolescent specific crisis. If your county is not listed under Child/Adolescent, please call the adult number for your county      Adult Crisis Numbers: Child/Adolescent Crisis Numbers   [facility]: [phone] [facility]: [phone]   [facility]: [phone] [facility]: [phone]   [facility]: [phone] [place]: [phone]   [facility]: [phone] [place]: [phone]   [facility]: [phone]   [facility]: [phone]   [facility]: [phone]   [facility]: [phone] (daytime) [phone] (after hours, weekends, holidays)      Step 6: Making the environment safer (plan for lethal means safety):      Optional: What is most important to me and worth living for?   Family , pets     Dorothy Safety Plan. Sia Adams and Marito Tim. Used with  permission of the authors.

## 2025-02-24 NOTE — ASSESSMENT & PLAN NOTE
Orders:    sertraline (ZOLOFT) 100 mg tablet; Take 2 tablets (200 mg total) by mouth daily    LORazepam (ATIVAN) 0.5 mg tablet; Take 1 tablet (0.5 mg total) by mouth daily as needed for anxiety

## 2025-02-24 NOTE — TELEPHONE ENCOUNTER
Writer called and scheduled 6 week follow up with Hien Espinosa and went over ER contact on VC form.
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
## 2025-03-31 ENCOUNTER — TELEMEDICINE (OUTPATIENT)
Dept: PSYCHIATRY | Facility: CLINIC | Age: 30
End: 2025-03-31
Payer: COMMERCIAL

## 2025-03-31 ENCOUNTER — TELEPHONE (OUTPATIENT)
Dept: PSYCHIATRY | Facility: CLINIC | Age: 30
End: 2025-03-31

## 2025-03-31 DIAGNOSIS — F41.0 GENERALIZED ANXIETY DISORDER WITH PANIC ATTACKS: Primary | ICD-10-CM

## 2025-03-31 DIAGNOSIS — F33.0 MILD EPISODE OF RECURRENT MAJOR DEPRESSIVE DISORDER (HCC): ICD-10-CM

## 2025-03-31 DIAGNOSIS — F41.1 GENERALIZED ANXIETY DISORDER WITH PANIC ATTACKS: Primary | ICD-10-CM

## 2025-03-31 DIAGNOSIS — F42.2 MIXED OBSESSIONAL THOUGHTS AND ACTS: ICD-10-CM

## 2025-03-31 PROCEDURE — 99214 OFFICE O/P EST MOD 30 MIN: CPT

## 2025-03-31 RX ORDER — SERTRALINE HYDROCHLORIDE 100 MG/1
100 TABLET, FILM COATED ORAL DAILY
Qty: 30 TABLET | Refills: 1 | Status: SHIPPED | OUTPATIENT
Start: 2025-03-31

## 2025-04-10 DIAGNOSIS — F41.0 GENERALIZED ANXIETY DISORDER WITH PANIC ATTACKS: ICD-10-CM

## 2025-04-10 DIAGNOSIS — F33.0 MILD EPISODE OF RECURRENT MAJOR DEPRESSIVE DISORDER (HCC): ICD-10-CM

## 2025-04-10 DIAGNOSIS — F41.1 GENERALIZED ANXIETY DISORDER WITH PANIC ATTACKS: ICD-10-CM

## 2025-04-10 DIAGNOSIS — F42.2 MIXED OBSESSIONAL THOUGHTS AND ACTS: ICD-10-CM

## (undated) DEVICE — ADHESIVE SKIN HIGH VISCOSITY EXOFIN 1ML

## (undated) DEVICE — VIAL DECANTER

## (undated) DEVICE — ENDOPATH 5MM CURVED SCISSORS WITH MONOPOLAR CAUTERY: Brand: ENDOPATH

## (undated) DEVICE — SYRINGE 10ML LL

## (undated) DEVICE — CHLORAPREP HI-LITE 26ML ORANGE

## (undated) DEVICE — 1820 FOAM BLOCK NEEDLE COUNTER: Brand: DEVON

## (undated) DEVICE — GLOVE PI ULTRA TOUCH SZ.6.5

## (undated) DEVICE — DEVICE MYOSURE TISSUE REMOVAL HYSTEROSCOPIC

## (undated) DEVICE — DRAPE LAPAROTOMY W/POUCHES

## (undated) DEVICE — GLOVE INDICATOR PI UNDERGLOVE SZ 7 BLUE

## (undated) DEVICE — CHLORHEXIDINE 4PCT 4 OZ

## (undated) DEVICE — TROCAR: Brand: KII® SLEEVE

## (undated) DEVICE — PACK FLUENT DISP

## (undated) DEVICE — UNDER BUTTOCKS DRAPE W/FLUID CONTROL POUCH: Brand: CONVERTORS

## (undated) DEVICE — UTERINE MANIPULATOR 4.5MM STRL

## (undated) DEVICE — NEEDLE 25G X 1 1/2

## (undated) DEVICE — STRL ALLENTOWN HYSTEROSCOPY PK: Brand: CARDINAL HEALTH

## (undated) DEVICE — TOWEL SET X-RAY

## (undated) DEVICE — PVC URETHRAL CATHETER: Brand: DOVER

## (undated) DEVICE — ENDOPATH PNEUMONEEDLE INSUFFLATION NEEDLES WITH LUER LOCK CONNECTORS 120MM: Brand: ENDOPATH

## (undated) DEVICE — STERILE SURGICAL LUBRICANT,  TUBE: Brand: SURGILUBE

## (undated) DEVICE — SPONGE 4 X 4 XRAY 16 PLY STRL LF RFD

## (undated) DEVICE — 5 MM CURVED DISSECTORS WITH MONOPOLAR CAUTERY: Brand: ENDOPATH

## (undated) DEVICE — INTENDED FOR TISSUE SEPARATION, AND OTHER PROCEDURES THAT REQUIRE A SHARP SURGICAL BLADE TO PUNCTURE OR CUT.: Brand: BARD-PARKER SAFETY BLADES SIZE 11, STERILE

## (undated) DEVICE — TROCAR: Brand: KII FIOS FIRST ENTRY

## (undated) DEVICE — ENDOMETRIAL BX PIPELLE

## (undated) DEVICE — MAYO STAND COVER: Brand: CONVERTORS

## (undated) DEVICE — TUBING INSUFFLATION SET ISO CONNECTOR

## (undated) DEVICE — ANTI-FOG SOLUTION WITH FOAM PAD: Brand: DEVON

## (undated) DEVICE — LIGHT GLOVE GREEN

## (undated) DEVICE — SPONGE LAP 18 X 18 IN

## (undated) DEVICE — LIGHT HANDLE COVER SLEEVE DISP BLUE STELLAR

## (undated) DEVICE — HEAVY DUTY TABLE COVER: Brand: CONVERTORS

## (undated) DEVICE — PREMIUM DRY TRAY LF: Brand: MEDLINE INDUSTRIES, INC.

## (undated) DEVICE — SUT VICRYL 4-0 PS-2 27 IN J426H